# Patient Record
Sex: FEMALE | Race: WHITE
[De-identification: names, ages, dates, MRNs, and addresses within clinical notes are randomized per-mention and may not be internally consistent; named-entity substitution may affect disease eponyms.]

---

## 2018-06-14 ENCOUNTER — HOSPITAL ENCOUNTER (EMERGENCY)
Age: 78
Discharge: HOME | End: 2018-06-14
Payer: MEDICARE

## 2018-06-14 VITALS
OXYGEN SATURATION: 95 % | SYSTOLIC BLOOD PRESSURE: 130 MMHG | RESPIRATION RATE: 18 BRPM | TEMPERATURE: 97.7 F | HEART RATE: 83 BPM | DIASTOLIC BLOOD PRESSURE: 71 MMHG

## 2018-06-14 VITALS
SYSTOLIC BLOOD PRESSURE: 159 MMHG | OXYGEN SATURATION: 95 % | DIASTOLIC BLOOD PRESSURE: 79 MMHG | RESPIRATION RATE: 16 BRPM | HEART RATE: 91 BPM

## 2018-06-14 VITALS
HEART RATE: 83 BPM | RESPIRATION RATE: 14 BRPM | DIASTOLIC BLOOD PRESSURE: 87 MMHG | TEMPERATURE: 98.8 F | SYSTOLIC BLOOD PRESSURE: 152 MMHG | OXYGEN SATURATION: 96 %

## 2018-06-14 VITALS
RESPIRATION RATE: 15 BRPM | SYSTOLIC BLOOD PRESSURE: 145 MMHG | HEART RATE: 84 BPM | OXYGEN SATURATION: 94 % | DIASTOLIC BLOOD PRESSURE: 69 MMHG | TEMPERATURE: 97.88 F

## 2018-06-14 VITALS
HEART RATE: 78 BPM | DIASTOLIC BLOOD PRESSURE: 90 MMHG | OXYGEN SATURATION: 95 % | SYSTOLIC BLOOD PRESSURE: 106 MMHG | RESPIRATION RATE: 15 BRPM

## 2018-06-14 VITALS
OXYGEN SATURATION: 97 % | TEMPERATURE: 97.6 F | RESPIRATION RATE: 18 BRPM | SYSTOLIC BLOOD PRESSURE: 139 MMHG | DIASTOLIC BLOOD PRESSURE: 69 MMHG | HEART RATE: 87 BPM

## 2018-06-14 VITALS
HEART RATE: 87 BPM | DIASTOLIC BLOOD PRESSURE: 65 MMHG | OXYGEN SATURATION: 97 % | RESPIRATION RATE: 15 BRPM | SYSTOLIC BLOOD PRESSURE: 136 MMHG

## 2018-06-14 VITALS
OXYGEN SATURATION: 96 % | HEART RATE: 89 BPM | SYSTOLIC BLOOD PRESSURE: 152 MMHG | DIASTOLIC BLOOD PRESSURE: 88 MMHG | RESPIRATION RATE: 17 BRPM

## 2018-06-14 VITALS
TEMPERATURE: 97.5 F | DIASTOLIC BLOOD PRESSURE: 67 MMHG | HEART RATE: 82 BPM | RESPIRATION RATE: 18 BRPM | SYSTOLIC BLOOD PRESSURE: 145 MMHG | OXYGEN SATURATION: 97 %

## 2018-06-14 VITALS
OXYGEN SATURATION: 95 % | RESPIRATION RATE: 16 BRPM | DIASTOLIC BLOOD PRESSURE: 89 MMHG | SYSTOLIC BLOOD PRESSURE: 152 MMHG | HEART RATE: 81 BPM

## 2018-06-14 VITALS
RESPIRATION RATE: 12 BRPM | TEMPERATURE: 98.06 F | HEART RATE: 81 BPM | SYSTOLIC BLOOD PRESSURE: 153 MMHG | DIASTOLIC BLOOD PRESSURE: 80 MMHG | OXYGEN SATURATION: 98 %

## 2018-06-14 VITALS
OXYGEN SATURATION: 99 % | SYSTOLIC BLOOD PRESSURE: 140 MMHG | HEART RATE: 86 BPM | DIASTOLIC BLOOD PRESSURE: 74 MMHG | RESPIRATION RATE: 20 BRPM | TEMPERATURE: 98.06 F

## 2018-06-14 VITALS — SYSTOLIC BLOOD PRESSURE: 141 MMHG | DIASTOLIC BLOOD PRESSURE: 69 MMHG | TEMPERATURE: 99.14 F | OXYGEN SATURATION: 95 %

## 2018-06-14 VITALS — BODY MASS INDEX: 32.8 KG/M2

## 2018-06-14 VITALS — OXYGEN SATURATION: 95 %

## 2018-06-14 VITALS
SYSTOLIC BLOOD PRESSURE: 172 MMHG | TEMPERATURE: 97.34 F | HEART RATE: 84 BPM | OXYGEN SATURATION: 97 % | RESPIRATION RATE: 18 BRPM | DIASTOLIC BLOOD PRESSURE: 87 MMHG

## 2018-06-14 VITALS
TEMPERATURE: 99.3 F | SYSTOLIC BLOOD PRESSURE: 123 MMHG | DIASTOLIC BLOOD PRESSURE: 90 MMHG | RESPIRATION RATE: 17 BRPM | OXYGEN SATURATION: 94 % | HEART RATE: 91 BPM

## 2018-06-14 DIAGNOSIS — R53.1: Primary | ICD-10-CM

## 2018-06-14 LAB
ADD MANUAL DIFF / SLIDE REVIEW: NO
ALBUMIN SERPL-MCNC: 4.4 G/DL (ref 3.5–5)
ALBUMIN/GLOB SERPL: 1.3 {RATIO} (ref 1–2.8)
ALP SERPL-CCNC: 64 U/L (ref 38–126)
ALT SERPL-CCNC: 52 IU/L (ref 9–52)
BUN SERPL-MCNC: 24 MG/DL (ref 7–17)
CALCIUM SERPL-MCNC: 9.8 MG/DL (ref 8.4–10.2)
CHLORIDE SERPL-SCNC: 94 MMOL/L (ref 98–107)
CO2 SERPL-SCNC: 29 MMOL/L (ref 22–32)
ESTIMATED GLOMERULAR FILT RATE: > 60 ML/MIN (ref 60–?)
GLOBULIN SER CALC-MCNC: 3.3 G/DL (ref 1.7–4.1)
GLUCOSE SERPL-MCNC: 101 MG/DL (ref 80–110)
HEMATOCRIT: 40.3 % (ref 36–46)
HEMOGLOBIN: 13.8 G/DL (ref 12–16)
HEMOLYSIS: < 15 (ref 0–50)
MCV RBC: 94.1 FL (ref 80–100)
MEAN CORPUSCULAR HEMOGLOBIN: 32.2 PG (ref 26–34)
MEAN CORPUSCULAR HGB CONC: 34.2 % (ref 30–36)
PLATELET COUNT: 290 X10^3/UL (ref 150–400)
POTASSIUM SERPL-SCNC: 3.3 MMOL/L (ref 3.4–5.1)
PROT SERPL-MCNC: 7.7 G/DL (ref 6.3–8.2)
SODIUM SERPL-SCNC: 136 MMOL/L (ref 137–145)

## 2018-06-14 PROCEDURE — 99282 EMERGENCY DEPT VISIT SF MDM: CPT

## 2018-06-14 PROCEDURE — 85025 COMPLETE CBC W/AUTO DIFF WBC: CPT

## 2018-06-14 PROCEDURE — 36591 DRAW BLOOD OFF VENOUS DEVICE: CPT

## 2018-06-14 PROCEDURE — 0HBU0ZZ EXCISION OF LEFT BREAST, OPEN APPROACH: ICD-10-PCS | Performed by: SPECIALIST

## 2018-06-14 PROCEDURE — 73502 X-RAY EXAM HIP UNI 2-3 VIEWS: CPT

## 2018-06-14 PROCEDURE — 80053 COMPREHEN METABOLIC PANEL: CPT

## 2018-06-14 PROCEDURE — 93005 ELECTROCARDIOGRAM TRACING: CPT

## 2018-06-14 RX ADMIN — SODIUM CHLORIDE, SODIUM LACTATE, POTASSIUM CHLORIDE, AND CALCIUM CHLORIDE 200 ML: .6; .31; .03; .02 INJECTION, SOLUTION INTRAVENOUS at 16:36

## 2018-06-14 RX ADMIN — LATANOPROST 1 DROPS: 50 SOLUTION OPHTHALMIC at 21:40

## 2018-06-14 RX ADMIN — CEFAZOLIN SODIUM 200 GM: 2 INJECTION, SOLUTION INTRAVENOUS at 18:40

## 2018-06-14 RX ADMIN — MIDAZOLAM HYDROCHLORIDE 2 MG: 1 INJECTION, SOLUTION INTRAMUSCULAR; INTRAVENOUS at 19:48

## 2018-06-14 RX ADMIN — DEXTROSE AND SODIUM CHLORIDE 125 ML: 5; 450 INJECTION, SOLUTION INTRAVENOUS at 21:38

## 2018-06-14 RX ADMIN — GABAPENTIN 300 MG: 300 CAPSULE ORAL at 21:39

## 2018-06-14 RX ADMIN — DOXEPIN HYDROCHLORIDE 25 MG: 25 CAPSULE ORAL at 21:40

## 2018-06-14 RX ADMIN — MIRTAZAPINE 30 MG: 15 TABLET, FILM COATED ORAL at 21:41

## 2018-06-14 RX ADMIN — MIDAZOLAM HYDROCHLORIDE 2 MG: 1 INJECTION, SOLUTION INTRAMUSCULAR; INTRAVENOUS at 19:43

## 2018-06-14 NOTE — P.OP.PRE_ITS
Pre-operative Note    
Interval Note    
Pre-op Check: History & Physical Reviewed by Physician and Exam Performed    
H&P completed within 30 days and has changed as indicated here:: Fell yesterday   
and has back pain across her lower back.  She is known to have chronic back   
pain. She also has developed redness and drainage around the sebaceous cyst in   
the left breast.  She is desperate to have it removed. I told her that I   
probably could not close it. Because she 8 lunch we have had to wait 6 hr   
before proceeding.  No other changes.

## 2018-06-14 NOTE — PATH_ITS
[facility] Accession Number: [id number]  
.                                                                01  
Material submitted:                                        .  
LEFT BREAST CYST  
.                                                                01  
Clinical history:                                          .  
HX OF LEFT BREAST CANCER AND RADIATION  
.                                                                02  
**********************************************************************  
Diagnosis:  
Left Breast Cyst, Excision:  
     Skin with no significant diagnostic abnormality.  
     Submucosal fibrous tissue with patchy ossification and associated  
      abscess.  
     Changes consistent with previous instrumentation are present.  
     Foreign body material present.  
     Negative for atypia or malignancy; negative for atypical epithelial  
      cells by immunohistochemistry studies.  
.  
.  
[facility]/06/21/2018  
**********************************************************************  
.                                                                02  
Electronically signed:                                     .  
Tabby Ramirez MD, Pathologist  
NPI- [id number]  
.                                                                01  
Gross description:                                         .  
Received in formalin, labeled left breast cyst, is an unoriented piece of  
tan-yellow firm adipose tissue (10.5 x 5.5 x 3.2 cm) with overlying skin  
(6.5 x 3.9 cm).  The skin is irregularly firm and ulcerated (1.2 x 1.1 cm)  
located 1.2 cm from the closest peripheral resection margin.  The  
underlying tissue is fatty and focally hard and mineralized.  The  
resection margin is inked black. Four bisected full thickness sections  
submitted.  ([id number])  
/[facility]  
.                                                                02  
Microscopic:                                                    .  
A.  An immunohistochemical stain was performed on block A5 to evaluate for  
ABHIJIT, and is negative.  
.  
The rare atypical stromal cells are immunonegative for ABHIJIT,  
consistent with a reactive, non-epithelial origin.  
.  
* This test was developed and its performance characteristics determined  
by [employer]. It has not been cleared or approved by the U.S. Food and Drug  
Administration. The FDA has determined that such clearance or approval is  
not necessary. This test is used for clinical purposes. It should not be  
regarded as investigational or for research.  
.                                                                02  
Pathologist provided ICD-10:  
Z85.3  
.                                                                02  
CPT                                                        .  
393373, K11048  
***Performed at:  01  
   [facility]  
   [address] [place] [address], [place], [place]  [id number]  
   MD Daniel Toweill MD Phone:  [phone]  
***Performed at:  02  
   [facility]  
   [address] [place], [place]  [id number]  
   MD Hero Wilson MD Phone:  [phone]

## 2018-06-14 NOTE — PM.OP.1
Operative Date/Time/Diagnoses
-
Date of procedure: 06/14/18
Time of procedure: 19:54
Pre-op diagnosis: Large inflamed sebaceous cyst chronic left breast.  History of breast cancer 

Post-op diagnosis: same

Procedure & Clinicians
Procedure: Removal of this very large inflamed cyst with closure over drain.(lumpectomy)
Same procedure as scheduled: Yes
Indications: Large neglected cyst  on the breast.  Skin has become inflamed at the edge of the opening.
Surgeon: Preston Shi
Click Yes if Unassisted: Yes
Anesthesia Type: General
Operative Notes
Findings: Very large cyst with an extension inferiorly that led deep to the breast almost to the muscle chest wall
Closure Type: primary (Over a drain)
Specimen(s): other (Large cyst)
Implants & Drains: 19 mm Roque
Estimated Blood Loss (mL): 15
Blood products transfused: none
Procedure in detail: The patient was placed supine on the operating table underwent general LMA anesthesia.  She was prepped and draped in the usual fashion using Betadine.  An incision was made around this very large inflamed cyst in normal 
appearing tissue.  Was carried deep to the cyst which was quite significant.  In the inferior aspect of this wound the cyst cavity actually dive very deep almost to the chest wall. All of this was excised and submitted.  Hemostasis was achieved with 
cautery and 3 0 Polysorb suture ligatures.  The wound was copiously irrigated.  I loosely closed the skin using large vertical mattress nylon sutures over a Roque drain which was left in the deep section of this biopsy cavity. It was brought out 
through the wound. This was done because the patient is incapable managing the wound and lives alone.  While we hope to find a way to place her or get assistance, there is no acute that that would happen this is a risky procedure because of the 
inflammation but I think stands probably a 3/4 chance of succeeding.  We will continue antibiotics postop.  A dressing was applied the patient tolerated the procedure well.  She is awakened and taken to the recovery area in good condition.
Complications: none
Condition: stable
Disposition: PACU

## 2018-06-15 ENCOUNTER — HOSPITAL ENCOUNTER (INPATIENT)
Dept: HOSPITAL 73 - OR | Age: 78
LOS: 4 days | Discharge: NURSING, REHAB OR LONG-TERM CARE | DRG: 584 | End: 2018-06-19
Payer: MEDICARE

## 2018-06-15 VITALS
HEART RATE: 82 BPM | RESPIRATION RATE: 18 BRPM | TEMPERATURE: 97.7 F | OXYGEN SATURATION: 95 % | SYSTOLIC BLOOD PRESSURE: 147 MMHG | DIASTOLIC BLOOD PRESSURE: 64 MMHG

## 2018-06-15 VITALS
SYSTOLIC BLOOD PRESSURE: 108 MMHG | DIASTOLIC BLOOD PRESSURE: 59 MMHG | HEART RATE: 89 BPM | TEMPERATURE: 98 F | RESPIRATION RATE: 18 BRPM | OXYGEN SATURATION: 98 %

## 2018-06-15 VITALS
RESPIRATION RATE: 16 BRPM | SYSTOLIC BLOOD PRESSURE: 142 MMHG | HEART RATE: 76 BPM | TEMPERATURE: 98.06 F | OXYGEN SATURATION: 93 % | DIASTOLIC BLOOD PRESSURE: 82 MMHG

## 2018-06-15 VITALS
RESPIRATION RATE: 16 BRPM | DIASTOLIC BLOOD PRESSURE: 70 MMHG | HEART RATE: 75 BPM | OXYGEN SATURATION: 93 % | TEMPERATURE: 98.06 F | SYSTOLIC BLOOD PRESSURE: 136 MMHG

## 2018-06-15 VITALS
SYSTOLIC BLOOD PRESSURE: 120 MMHG | OXYGEN SATURATION: 93 % | HEART RATE: 78 BPM | TEMPERATURE: 96.44 F | RESPIRATION RATE: 18 BRPM | DIASTOLIC BLOOD PRESSURE: 62 MMHG

## 2018-06-15 VITALS — OXYGEN SATURATION: 94 %

## 2018-06-15 VITALS — BODY MASS INDEX: 32.1 KG/M2

## 2018-06-15 VITALS — OXYGEN SATURATION: 95 %

## 2018-06-15 VITALS — OXYGEN SATURATION: 96 %

## 2018-06-15 VITALS
RESPIRATION RATE: 16 BRPM | TEMPERATURE: 97.16 F | DIASTOLIC BLOOD PRESSURE: 71 MMHG | OXYGEN SATURATION: 94 % | HEART RATE: 77 BPM | SYSTOLIC BLOOD PRESSURE: 106 MMHG

## 2018-06-15 VITALS — OXYGEN SATURATION: 93 %

## 2018-06-15 DIAGNOSIS — N60.82: Primary | ICD-10-CM

## 2018-06-15 DIAGNOSIS — S32.511A: ICD-10-CM

## 2018-06-15 DIAGNOSIS — S73.191A: ICD-10-CM
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
## 2018-06-15 DIAGNOSIS — R09.02: ICD-10-CM

## 2018-06-15 DIAGNOSIS — F41.9: ICD-10-CM

## 2018-06-15 DIAGNOSIS — E03.9: ICD-10-CM

## 2018-06-15 DIAGNOSIS — Z91.81: ICD-10-CM

## 2018-06-15 DIAGNOSIS — J98.11: ICD-10-CM

## 2018-06-15 DIAGNOSIS — I10: ICD-10-CM
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
## 2018-06-15 DIAGNOSIS — R33.9: ICD-10-CM

## 2018-06-15 DIAGNOSIS — S32.10XA: ICD-10-CM
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
## 2018-06-15 DIAGNOSIS — F03.90: ICD-10-CM

## 2018-06-15 DIAGNOSIS — E87.6: ICD-10-CM

## 2018-06-15 DIAGNOSIS — K21.9: ICD-10-CM

## 2018-06-15 LAB
ADD MANUAL DIFF / SLIDE REVIEW: NO
BUN SERPL-MCNC: 13 MG/DL (ref 7–17)
CALCIUM SERPL-MCNC: 8.6 MG/DL (ref 8.4–10.2)
CHLORIDE SERPL-SCNC: 94 MMOL/L (ref 98–107)
CO2 SERPL-SCNC: 30 MMOL/L (ref 22–32)
ESTIMATED GLOMERULAR FILT RATE: > 60 ML/MIN (ref 60–?)
GLUCOSE SERPL-MCNC: 135 MG/DL (ref 80–110)
HEMATOCRIT: 36.7 % (ref 36–46)
HEMOGLOBIN: 12.6 G/DL (ref 12–16)
HEMOLYSIS: < 15 (ref 0–50)
MCV RBC: 94.7 FL (ref 80–100)
MEAN CORPUSCULAR HEMOGLOBIN: 32.4 PG (ref 26–34)
MEAN CORPUSCULAR HGB CONC: 34.2 % (ref 30–36)
PLATELET COUNT: 231 X10^3/UL (ref 150–400)
POTASSIUM SERPL-SCNC: 3.1 MMOL/L (ref 3.4–5.1)
SODIUM SERPL-SCNC: 131 MMOL/L (ref 137–145)

## 2018-06-15 PROCEDURE — 97530 THERAPEUTIC ACTIVITIES: CPT

## 2018-06-15 PROCEDURE — 97116 GAIT TRAINING THERAPY: CPT

## 2018-06-15 PROCEDURE — 73502 X-RAY EXAM HIP UNI 2-3 VIEWS: CPT

## 2018-06-15 PROCEDURE — 36591 DRAW BLOOD OFF VENOUS DEVICE: CPT

## 2018-06-15 PROCEDURE — 80053 COMPREHEN METABOLIC PANEL: CPT

## 2018-06-15 PROCEDURE — 85025 COMPLETE CBC W/AUTO DIFF WBC: CPT

## 2018-06-15 PROCEDURE — 94760 N-INVAS EAR/PLS OXIMETRY 1: CPT

## 2018-06-15 PROCEDURE — 80048 BASIC METABOLIC PNL TOTAL CA: CPT

## 2018-06-15 PROCEDURE — 36415 COLL VENOUS BLD VENIPUNCTURE: CPT

## 2018-06-15 PROCEDURE — 97110 THERAPEUTIC EXERCISES: CPT

## 2018-06-15 PROCEDURE — 97162 PT EVAL MOD COMPLEX 30 MIN: CPT

## 2018-06-15 PROCEDURE — 97535 SELF CARE MNGMENT TRAINING: CPT

## 2018-06-15 PROCEDURE — 19120 REMOVAL OF BREAST LESION: CPT

## 2018-06-15 PROCEDURE — 71045 X-RAY EXAM CHEST 1 VIEW: CPT

## 2018-06-15 PROCEDURE — 72100 X-RAY EXAM L-S SPINE 2/3 VWS: CPT

## 2018-06-15 PROCEDURE — 93005 ELECTROCARDIOGRAM TRACING: CPT

## 2018-06-15 PROCEDURE — 36592 COLLECT BLOOD FROM PICC: CPT

## 2018-06-15 PROCEDURE — 99282 EMERGENCY DEPT VISIT SF MDM: CPT

## 2018-06-15 PROCEDURE — 73721 MRI JNT OF LWR EXTRE W/O DYE: CPT

## 2018-06-15 RX ADMIN — GABAPENTIN 300 MG: 300 CAPSULE ORAL at 20:50

## 2018-06-15 RX ADMIN — GABAPENTIN 300 MG: 300 CAPSULE ORAL at 10:05

## 2018-06-15 RX ADMIN — DOXEPIN HYDROCHLORIDE 25 MG: 25 CAPSULE ORAL at 20:50

## 2018-06-15 RX ADMIN — TRAZODONE HYDROCHLORIDE 100 MG: 100 TABLET ORAL at 22:50

## 2018-06-15 RX ADMIN — ENOXAPARIN SODIUM 40 MG: 40 INJECTION SUBCUTANEOUS at 10:05

## 2018-06-15 RX ADMIN — OXYCODONE AND ACETAMINOPHEN 2 TAB: 5; 325 TABLET ORAL at 06:42

## 2018-06-15 RX ADMIN — CEFAZOLIN SODIUM 200 GM: 2 INJECTION, SOLUTION INTRAVENOUS at 11:25

## 2018-06-15 RX ADMIN — MELOXICAM 7.5 MG: 7.5 TABLET ORAL at 10:05

## 2018-06-15 RX ADMIN — TRAZODONE HYDROCHLORIDE 100 MG: 100 TABLET ORAL at 00:06

## 2018-06-15 RX ADMIN — LATANOPROST 1 DROPS: 50 SOLUTION OPHTHALMIC at 20:50

## 2018-06-15 RX ADMIN — CEFAZOLIN SODIUM 200 GM: 2 INJECTION, SOLUTION INTRAVENOUS at 20:49

## 2018-06-15 RX ADMIN — OXYCODONE AND ACETAMINOPHEN 2 TAB: 5; 325 TABLET ORAL at 15:56

## 2018-06-15 RX ADMIN — DEXTROSE AND SODIUM CHLORIDE 125 ML: 5; 450 INJECTION, SOLUTION INTRAVENOUS at 16:00

## 2018-06-15 RX ADMIN — CEFAZOLIN SODIUM 200 GM: 2 INJECTION, SOLUTION INTRAVENOUS at 03:23

## 2018-06-15 RX ADMIN — ONDANSETRON 8 MG: 4 TABLET, ORALLY DISINTEGRATING ORAL at 09:11

## 2018-06-15 RX ADMIN — MIRTAZAPINE 30 MG: 15 TABLET, FILM COATED ORAL at 20:50

## 2018-06-15 RX ADMIN — DEXTROSE AND SODIUM CHLORIDE 125 ML: 5; 450 INJECTION, SOLUTION INTRAVENOUS at 05:52

## 2018-06-15 RX ADMIN — LEVOTHYROXINE SODIUM 100 MCG: 75 TABLET ORAL at 06:42

## 2018-06-15 RX ADMIN — PANTOPRAZOLE SODIUM 40 MG: 40 TABLET, DELAYED RELEASE ORAL at 10:05

## 2018-06-15 NOTE — ED_ITS
"HPI - Weakness    
General    
Chief complaint: Weakness    
Stated complaint: Weakness    
Time Seen by Provider: 06/14/18 13:38    
Source: patient, family, EMS and RN notes reviewed    
Mode of arrival: EMS    
Limitations: no limitations    
History of Present Illness    
HPI Narrative: Patient is a 70-year-old female presenting with increased   
increase in falls.  She apparently fell twice yesterday and has increased   
weakness today.  Today she said she lowered herself to the ground yesterday she   
did fall and hurt her right hip but she has been ambulatory.  She has is a very   
nice cane which she refuses to use.  She also has a Life Alert button which she   
also refuses to use.  She is actually quite good spirits.  She is supposed to   
have a sebaceous cyst removed from her left breast today infecting couple of   
hours.  She denies any fever or chills.      
MD Complaint: generalized weakness    
Related Data    
 Home Medications    
    
    
    
 Medication  Instructions  Recorded  Confirmed    
     
hydrochlorothiazide 50 mg PO QDAY #0 12/06/16 06/14/18    
     
cholecalciferol (vitamin D3) 5,000 unit PO DAILY 06/06/18 06/14/18    
    
[Vitamin D3]       
     
doxepin 25 mg PO BEDTIME 06/06/18 06/14/18    
     
hydrocodone-acetaminophen 1 - 2 tab PO DAILY PRN 06/06/18 06/14/18    
     
omeprazole 20 mg PO DAILY 06/06/18 06/14/18    
     
sucralfate 1 g PO QID 06/06/18 06/14/18    
     
fluticasone 1 spray INTRANASAL DAILY 06/14/18 06/14/18    
     
latanoprost 1 drp OPHTHALMIC (EYE) BEDTIME 06/14/18 06/14/18    
     
levothyroxine 100 mcg PO DAILY 06/14/18 06/14/18    
     
meclizine 25 mg PO Q8H PRN 06/14/18 06/14/18    
     
meloxicam 1 tab PO DAILY 06/14/18 06/14/18    
     
mirtazapine 1 tab PO BEDTIME 06/14/18 06/14/18    
     
ondansetron HCl 8 mg PO Q8H PRN 06/14/18 06/14/18    
     
trazodone 100 mg PO BEDTIME PRN 06/14/18 06/14/18    
    
    
    
 Allergies    
    
    
    
Allergy/AdvReac Type Severity Reaction Status Date / Time    
     
No Known Drug Allergies Allergy   Verified 06/14/18 16:08    
    
    
    
    
Review of Systems    
Review of Systems    
All systems reviewed & are unremarkable except as noted in HPI and below     
Constitutional    
Denies chills, Reports fatigue, Denies fever(s), Reports frequent falls, Denies   
lethargy and Denies weakness    
Cardiovascular    
Denies chest pain, Denies irregular heart rhythm, Denies lightheadedness,   
Denies palpitations, Denies dyspnea, Denies dyspnea on exertion and Denies   
orthopnea    
Respiratory    
Denies cough, Denies dyspnea, Denies dyspnea on exertion and Denies wheezing    
Gastrointestinal    
Gastrointestinal: Denies abdominal pain, Denies change in bowel habits, Denies   
diarrhea, Denies nausea and Denies vomiting    
Genitourinary    
Denies hematuria, Denies flank pain, Denies urinary incontinence and Denies   
urinary urgency    
Musculoskeletal    
Reports system reviewed and no additional complaints, except as docu, Reports   
as per HPI, Denies back pain, Denies muscle weakness, Denies numbness and   
Denies tingling    
Neurologic    
Reports frequent falls, Denies numbness, Denies tingling and Denies weakness    
Endocrine    
Reports fatigue and Denies palpitations    
Allergic/Immunologic    
Denies wheezing    
    
PFSH    
Medical History    
    
Anxiety (Acute)    
Bakers cyst (Acute)    
Bruises easily (Acute)    
History of UTI (Acute)    
Hypothyroid (Acute)    
Impaired vision (Acute)    
Spinal stenosis (Acute)    
    
    
Surgical History    
    
History of colonoscopy (Acute)    
History of esophagogastroduodenoscopy (EGD) (Acute)    
Status post breast lumpectomy    
Status post hysterectomy with oophorectomy    
    
    
Family History    
Sister  Age: 75 Osteoporosis    
    
    
Social History    
household members:  none    
    
Smoking Stat
[id number]|2018-06-14 13:40:00|2018-06-14 14:16:00|DI.RAD.S_ITS|CLIK|Imaging|[id number]|"PROCEDURE:  XR HIP W PEL IF DONE RT 2V

## 2018-06-15 NOTE — CM.DANOTE
Addendum entered by ALBARO Duran  06/15/18 15:46: 
In addition to needing skilled PT/OT, pt will require wound care per notes. 


Original Note:

DCP Assessment: Pt is a 77 yo female, resident of [place], [place]. Pt admitted for an excision of her left breast by Dr Shi, and in addition, pt has had multiple recent falls, Ortho consult pending. Pt's PCP is Dr Scott; Insurance is Medicare. 

Spoke w/ Vanessa, PT. She explained that pt is requiring max assist of 2 people for sit to stand. Pt is mostly indp at her baseline. Dtr Lupis told Vanessa away from bedside that she could not have pt staying w/her more than a few days because they do not 
get along well. 

Met w/pt and her dtr at bedside, explained role. Both reviewed the falls that pt has had at home and both this MSW and dtr question why pt will not use her lifeline button (?) Pt admits its pride that stops her. Discussed PT's recommendation for SNF 
and pt not agreeable at this time, says she wants to go home. 

This MSW was called out of the  for an emergent DC and when I returned, pt was alone and reiterated  I'm going home . 

Per review of Dr Constantino's consult note; MRI ordered in hopes an etiology can be found for pt's pain and recent falls. 

This MSW following closely for coordination of the safest plan that pt will be agreeable to. 

ALBARO Duran

## 2018-06-15 NOTE — PT.IPTN
Current Diagnoses

Sebaceous cyst (06/15/18)
Other benign mammary dysplasias of left breast (06/15/18)

Surgery Performed

Operation Date: 06/14/18 15:15
Actual Procedures
p Excision Breast Cyst(Left) - Preston Shi MD

Physical Therapy Treatment Note

M2 PT-IP Current Condition                                 Start:  06/15/18 12:22
Freq:   AS NEEDED                                          Status: Active        
Protocol:                                                                        
 Document     06/15/18 15:14  AB  (Rec: 06/15/18 15:23  AB  [email or web address])
 Physical Therapy Current Condition
     Current Condition
      Evaluation Date                            06/15/18
      Treatment Diagnosis                        s/p L lumpectomy
      Onset Date                                 06/14/18
     Weight Bearing Status
      Weight Bearing Status                      Non-Weight Bearing
      Allowed Weight Bearing Amount (enter %     pt seen for PT eval this
       or #) (%)                                 morning. spoke with case
                                                 manager regarding SNF
                                                 recommendation this morning.
                                                  informed PT ~2pm
                                                 that pt was seen by Dr. Constantino for R hip pain and
                                                 MRI was ordered.  Checked for
                                                 MRI result and no result as of
                                                 this time.  Read Dr. Constantino
                                                 's consultation note ~310 pm
                                                 and per documentation:   At
                                                 this time she should be
                                                 nonweightbearing on the right
                                                 lower extremity until advanced
                                                 imaging is reviewed.

## 2018-06-15 NOTE — PT.IPTN
"
Current Diagnoses

Sebaceous cyst (06/15/18)
Other benign mammary dysplasias of left breast (06/15/18)

Surgery Performed

Operation Date: 06/14/18 15:15
Actual Procedures
p Excision Breast Cyst(Left) - Preston Shi MD

Physical Therapy Treatment Note

M2 PT-IP Current Condition                                 Start:  06/15/18 12:22
Freq:   AS NEEDED                                          Status: Active        
Protocol:                                                                        
 Document     06/15/18 15:14  AB  (Rec: 06/15/18 15:23  AB  [email or web address])
 Physical Therapy Current Condition
     Current Condition
      Evaluation Date                            06/15/18
      Treatment Diagnosis                        s/p L lumpectomy
      Onset Date                                 06/14/18
     Weight Bearing Status
      Weight Bearing Status                      Non-Weight Bearing
      Allowed Weight Bearing Amount (enter %     pt seen for PT eval this
       or #) (%)                                 morning. spoke with case
                                                 manager regarding SNF
                                                 recommendation this morning.
                                                  informed PT ~2pm
                                                 that pt was seen by Dr. Constantino for R hip pain and
                                                 MRI was ordered.  Checked for
                                                 MRI result and no result as of
                                                 this time.  Read Dr. Constantino
                                                 's consultation note ~310 pm
                                                 and per documentation:   At
                                                 this time she should be
                                                 nonweightbearing on the right
                                                 lower extremity until advanced
                                                 imaging is reviewed. 
M3 PT-IP Subjective                                        Start:  06/15/18 12:22
Freq:   AS NEEDED                                          Status: Active        
Protocol:                                                                        
 Document     06/15/18 12:23  AB  (Rec: 06/15/18 12:35  AB  [email or web address])
 Subjective
     Physical Therapy Visit Type
      Type                                       Initial Evaluation
      Visit Start Time                           11:45
      Visit Stop Time                            12:26
      Total Visit Minutes                        41
      Number of PTA Visits                       0
     Physical Therapy Visit Comments
      Patient Comments                           pt hesistant to get out of bed
                                                 but agreed to try
 Therapy Pain Assessment
     Pain
      When Pain Assessed                         During Mobility
     Pain Present
      Pain Present                               Pain Reported
     Location
      Right Groin
       Intensity                                 8
       Scale Used                                Numeric (1 - 10)
M4 PT-IP Mobility and Gait                                 Start:  06/15/18 12:22
Freq:   AS NEEDED                                          Status: Active        
Protocol:                                                                        
 Document     06/15/18 12:23  AB  (Rec: 06/15/18 12:35  AB  [email or web address])
 PT-Bed Mobility Assessment
     Rolling
       Level of Assist                           Maximal Assistance
                                                 1 Person Assistance
                                                 2 Person Assistance
 PT-Transfer Assessment
     Sit to and From Stand
       Sit to and from Stand 
[id number]|2018-06-17 09:57:38|2018-06-17 09:57:38|PM.PN.1||||"Subjective

## 2018-06-15 NOTE — DI.MRI.S_ITS
PROCEDURE:  MR HIP RT WO CON  
   
INDICATIONS:  Right hip/groin pain post fall  
   
TECHNIQUE:    
Noncontrast coronal T1 spin echo and STIR through the bony pelvis.  Coronal and axial T2   
fast spin echo with fat saturation, sagittal T1 spin echo, and oblique axial T2 fast spin   
echo with fat saturation through the hip.    
   
COMPARISON:  [facility], CR, XR HIP W PEL IF DONE RT 2V, 6/14/2018, 13:23.  [facility], CT, ABDOMEN/PELVIS WITH CONTRAST, 2/22/2017, 11:16.  
   
FINDINGS:    
Image quality: Diagnostic.  
   
Bones and joints: There is no acute fracture, dislocation, suspicious osseous lesion, or   
evidence of avascular necrosis involving the right hip.  Marrow edema is evident,   
however, involving the superior pubic ramus with an associated nondisplaced fracture   
evident.  A similar appearance is noted involving the inferior pubic ramus, as well.    
There is no widening of the pubis symphysis.  Edema is evident within the adjacent soft   
tissues.  There also is edema evident involving the right aspect of the sacral ala with   
probable microtrabecular fracture lines present.  There mild degenerative changes of the   
right hip.  Degenerative changes of the included lower lumbar spine, sacroiliac joints,   
and left hip are noted.  There is a small right hip joint effusion.  
   
Tendons and ligaments:  There is slight increased signal identified involving the distal   
gluteus medius and gluteus minimus tendons without significant tearing evident.  Fluid is   
contained within the greater trochanteric bursa.  The distal iliopsoas tendons are   
intact.  The proximal hamstrings tendons demonstrate slight increased signal.  There is   
diffuse muscle edema identified involving all of the adductor muscles.  There also   
appears to be edema involving the right piriformis muscles and the distal margins of the   
gluteus medius and gluteus minimus muscles.  The ligamentum teres appears intact where   
visualized.    
   
Labrum: Evaluation of the acetabular labrum is difficult without intra-articular   
contrast.  However, there is increased signal identified at the labral cartilaginous   
junction of the superior labrum, suggesting a small anterosuperior labral tear.  
   
Soft tissues:  Visualized muscles demonstrate normal bulk and internal signal.  Quadratus   
femoris muscle demonstrates no internal edema to suggest ischiofemoral impingement.  The   
proximal sciatic neurovascular bundle appears normal adjacent to the hamstring tendons.    
No free pelvic fluid.  Bladder wall thickness is normal.  Genitourinary structures and   
bowel loops appear normal where visualized.    
   
IMPRESSION:  
1.  No acute right hip fracture.  
2.  Nondisplaced superior and inferior right pubic rami fractures.  
3.  Right sacral insufficiency fracture.  
4.  Adductor muscle edema is felt to be reactive.  
5.  Small anterosuperior right acetabular labral tear.  
6.  Small right hip joint effusion.  
7.  Mild distal gluteus medius and gluteus minimus tendinopathy.  Moderate amount of   
fluid is contained within the greater trochanteric bursa.  Please correlate clinically   
for possible bursitis.  
8.  Mild proximal right hamstrings tendinopathy.    
   
Dictated by: Julito Butts M.D. on 6/15/2018 at 14:12       
Approved by: Julito Butts M.D. on 6/15/2018 at 14:24

## 2018-06-15 NOTE — PM.PNPO.1
Subjective
Date Patient Seen: 06/15/18
Time Patient Seen: 11:51
Interval history: Patient required 2 people to assist her to stand and sit in a chair.  She was unable to really walk and could not weight bear use the right leg well. One daughter asked me if she thought she had a stroke.

Exam
Vital Signs
(past 8 hours):  Vital Signs - 8 hr

 06/15/18
09:34 06/15/18
11:55 06/15/18
13:10
Temperature  97.2 F L 
Pulse Rate  77 
Respiratory Rate  16 
Blood Pressure  106/71 
Pulse Oximetry 93 94 94



Pulse Oximetry                 94
Oxygen Delivery Method         Room Air
Oxygen Flow Rate               0


Narrative
Exam Narrative: Her breast looks fine.  Drainage is bloody as would be expected.  It is not purulent.  There is no cellulitis.  There was drainage on the dressing as expected.  The patient complains of pain in the right hip and medially but she is 
alert as ever.  She is oriented. She moves her other extremities without difficulty.

Objective
Labs

Result Diagrams: 
06/15/18 05:58          

Labs:  Laboratory Results - last 24 hr

  06/15/18
  05:58
WBC  9.1
RBC  3.88 L
Hgb  12.6
Hct  36.7
MCV  94.7
MCH  32.4
MCHC  34.2
RDW  13.6
Plt Count  231
Neut % (Auto)  78.2 H
Lymph % (Auto)  10.0 L
Mono % (Auto)  8.1
Eos % (Auto)  2.9
Baso % (Auto)  0.8
Neut # (Auto)  7100 H



Assessment & Plan Post-op
(1) Sebaceous cyst of skin of left breast: 
      Current Visit: Yes
      Status: Acute
      Assessment and plan: The site looks fine.  Drain can probably be removed tomorrow.  I will continue p.o. antibiotics for another 5 days, given the inflammation at operation and the depth of this wound and the prior radiation to the left breast.
(2) Pubic ramus fracture: 
      Problem details:
Newly discovered related to preop/pre-admission fall at home.
      Current Visit: Yes
      Status: Acute
      Assessment and plan: Plan Per Dr. Constantino.  Will require at least temporary skilled nursing home placement to rehab this hip.  Patient has a home with stairs and is unable to walk and lives alone.
Postoperative
Procedures

Operation Date: 06/14/18 15:15
Actual Procedures Side Surgeon
p Excision Breast Cyst Left Preston Shi MD


Postoperative day: 1
Time Spent With Patient
25 - 35 minutes

Quality
VTE
Deep Vein Thrombosis/Pulmonary Embolism Present on Admission: No

## 2018-06-15 NOTE — P.CONS_ITS
"History of Present Illness    
Date Patient Seen: 06/15/18    
Time Patient Seen: 13:35    
Chief complaint: excision left breast [id number]    
Reason for consult: Two recent falls at home unable to ambulate complaining of   
right hip pain    
Requesting provider: Preston Shi    
Narrative: She states that she fell 3 days ago while walking outside to deliver   
a letter to her neighbor.  She is unsure why she fell.  She denies a loss of   
conscious and states she looked around for which she may have tripped over but   
did not find any thing.  She is a community ambulator without assistive   
devices.  She does have a cane--but never uses it. She had a recent DEXA scan   
of her lumbar spine that was within normal limits.  She takes 5000 units of   
vitamin-D daily.  She has a history of spinal stenosis, and lumbar arthritis.   
She denies any heart or lung problems.  She is not on any blood thinners.  She   
does not smoke or use tobacco products.    
Location:  Right hip--groin pain and pain on the outside of the hip    
Quality:  Aching    
Severity:  Moderate unable to ambulate without assist, but pain well controlled   
at rest sitting in a chair or bed    
Duration:  3 days    
Contact:  The patient had 2 falls:  The 1st was 3 days ago walking outside to   
deliver a letter.  She crawled back inside, and fell again trying to get up a   
stair.  She then got into bed for the rest of the day.  Her daughter picked her   
up and brought her to her house the next day, but had to assist her walking to   
the car.  She has not ambulated independently since the 1st fall.  She was   
brought to the emergency room last night and unable to ambulate.  X-rays were   
taken of the patient's lumbar spine and right hip that did not show an obvious   
fracture. She was then brought to the operating room for a previously scheduled   
breast biopsy/cyst excision.  She was admitted after the surgery, and given her   
continued right hip pain and inability to ambulate orthopedic surgery was   
consulted.  The patient denies any previous injury to the hip. She does have a   
history of spinal stenosis.  He has no history of diabetes.    
    
[facility]    
Medical History (Reviewed 06/15/18 @ 13:36 by Eusebia Constantino MD)    
    
Anxiety (Acute)    
Bakers cyst (Acute)    
Bruises easily (Acute)    
History of UTI (Acute)    
Hypothyroid (Acute)    
Impaired vision (Acute)    
Spinal stenosis (Acute)    
    
    
Surgical History (Reviewed 06/15/18 @ 13:36 by Eusebia Constantino MD)    
    
History of colonoscopy (Acute)    
History of esophagogastroduodenoscopy (EGD) (Acute)    
Status post breast lumpectomy    
Status post hysterectomy with oophorectomy    
    
    
Family History (Reviewed 06/15/18 @ 13:36 by Eusebia Constantino MD)    
Sister  Age: 75 Osteoporosis    
    
    
Social History (Reviewed 06/15/18 @ 13:36 by Eusebia Constantino MD)    
household members:  none    
    
Smoking Status:  Never smoker    
    
alcohol intake:  current    
    
    
    
    
Meds    
 Home Medications    
    
    
    
 Medication  Instructions  Recorded  Confirmed  Type    
     
hydrochlorothiazide 50 mg PO QDAY #0 12/06/16 06/14/18 History    
     
cholecalciferol (vitamin D3) 5,000 unit PO DAILY 06/06/18 06/14/18 History    
    
[Vitamin D3]        
     
doxepin 25 mg PO BEDTIME 06/06/18 06/14/18 History    
     
hydrocodone-acetaminophen 1 - 2 tab PO DAILY PRN 06/06/18 06/14/18 History    
     
omeprazole 20 mg PO DAILY 06/06/18 06/14/18 History    
     
sucralfate 1 g PO QID 06/06/18 06/14/18 History    
     
fluticasone 1 spray INTRANASAL DAILY 06/14/18 06/14/18 History    
     
latanoprost 1 drp OPHTHALMIC (EYE) BEDTIME 06/14/18 06/14/18 History    
     
levothyroxine 100 mcg PO DAILY 06/14/18 06/14/18 History    
     
meclizine 25 mg PO Q8H PRN 06/14/18 06/14/18 History    
     
meloxicam 1 tab PO DAILY 06/14/18 06/14/18 History    
[id number]|2018-06-19 08:02:00|2018-06-19 07:57:00|P.PN_ITS|[facility]|Health Information Management|[id number]|"Subjective

## 2018-06-16 VITALS
RESPIRATION RATE: 16 BRPM | OXYGEN SATURATION: 91 % | TEMPERATURE: 97.88 F | DIASTOLIC BLOOD PRESSURE: 74 MMHG | HEART RATE: 78 BPM | SYSTOLIC BLOOD PRESSURE: 145 MMHG

## 2018-06-16 VITALS
SYSTOLIC BLOOD PRESSURE: 118 MMHG | TEMPERATURE: 98.42 F | OXYGEN SATURATION: 92 % | DIASTOLIC BLOOD PRESSURE: 59 MMHG | RESPIRATION RATE: 18 BRPM | HEART RATE: 85 BPM

## 2018-06-16 VITALS — OXYGEN SATURATION: 92 %

## 2018-06-16 VITALS
SYSTOLIC BLOOD PRESSURE: 109 MMHG | HEART RATE: 84 BPM | TEMPERATURE: 98.78 F | RESPIRATION RATE: 18 BRPM | OXYGEN SATURATION: 91 % | DIASTOLIC BLOOD PRESSURE: 64 MMHG

## 2018-06-16 VITALS
SYSTOLIC BLOOD PRESSURE: 136 MMHG | HEART RATE: 87 BPM | DIASTOLIC BLOOD PRESSURE: 80 MMHG | OXYGEN SATURATION: 91 % | TEMPERATURE: 98.06 F | RESPIRATION RATE: 18 BRPM

## 2018-06-16 VITALS — OXYGEN SATURATION: 94 %

## 2018-06-16 VITALS — OXYGEN SATURATION: 95 %

## 2018-06-16 VITALS — RESPIRATION RATE: 20 BRPM

## 2018-06-16 VITALS — OXYGEN SATURATION: 93 %

## 2018-06-16 RX ADMIN — ENOXAPARIN SODIUM 40 MG: 40 INJECTION SUBCUTANEOUS at 08:49

## 2018-06-16 RX ADMIN — MORPHINE SULFATE 4 MG: 4 INJECTION, SOLUTION INTRAMUSCULAR; INTRAVENOUS at 14:08

## 2018-06-16 RX ADMIN — OXYCODONE AND ACETAMINOPHEN 2 TAB: 5; 325 TABLET ORAL at 16:00

## 2018-06-16 RX ADMIN — DEXTROSE AND SODIUM CHLORIDE 125 ML: 5; 450 INJECTION, SOLUTION INTRAVENOUS at 11:42

## 2018-06-16 RX ADMIN — CEFAZOLIN SODIUM 200 GM: 2 INJECTION, SOLUTION INTRAVENOUS at 18:30

## 2018-06-16 RX ADMIN — OXYCODONE AND ACETAMINOPHEN 2 TAB: 5; 325 TABLET ORAL at 21:19

## 2018-06-16 RX ADMIN — MORPHINE SULFATE 4 MG: 4 INJECTION, SOLUTION INTRAMUSCULAR; INTRAVENOUS at 18:19

## 2018-06-16 RX ADMIN — PANTOPRAZOLE SODIUM 40 MG: 40 TABLET, DELAYED RELEASE ORAL at 08:50

## 2018-06-16 RX ADMIN — GABAPENTIN 300 MG: 300 CAPSULE ORAL at 21:20

## 2018-06-16 RX ADMIN — GABAPENTIN 300 MG: 300 CAPSULE ORAL at 08:50

## 2018-06-16 RX ADMIN — LEVOTHYROXINE SODIUM 100 MCG: 75 TABLET ORAL at 06:23

## 2018-06-16 RX ADMIN — DEXTROSE AND SODIUM CHLORIDE 125 ML: 5; 450 INJECTION, SOLUTION INTRAVENOUS at 22:03

## 2018-06-16 RX ADMIN — CEFAZOLIN SODIUM 200 GM: 2 INJECTION, SOLUTION INTRAVENOUS at 03:18

## 2018-06-16 RX ADMIN — OXYCODONE AND ACETAMINOPHEN 2 TAB: 5; 325 TABLET ORAL at 09:52

## 2018-06-16 RX ADMIN — DOXEPIN HYDROCHLORIDE 25 MG: 25 CAPSULE ORAL at 21:20

## 2018-06-16 RX ADMIN — LATANOPROST 1 DROPS: 50 SOLUTION OPHTHALMIC at 21:21

## 2018-06-16 RX ADMIN — ONDANSETRON 8 MG: 4 TABLET, ORALLY DISINTEGRATING ORAL at 19:28

## 2018-06-16 RX ADMIN — MIRTAZAPINE 30 MG: 15 TABLET, FILM COATED ORAL at 21:21

## 2018-06-16 RX ADMIN — MELOXICAM 7.5 MG: 7.5 TABLET ORAL at 08:50

## 2018-06-16 RX ADMIN — DEXTROSE AND SODIUM CHLORIDE 125 ML: 5; 450 INJECTION, SOLUTION INTRAVENOUS at 01:55

## 2018-06-16 RX ADMIN — POTASSIUM CHLORIDE 20 MEQ: 1500 TABLET, EXTENDED RELEASE ORAL at 13:43

## 2018-06-16 RX ADMIN — OXYCODONE AND ACETAMINOPHEN 2 TAB: 5; 325 TABLET ORAL at 03:18

## 2018-06-16 RX ADMIN — CEFAZOLIN SODIUM 200 GM: 2 INJECTION, SOLUTION INTRAVENOUS at 11:43

## 2018-06-16 NOTE — PM.PN.1
Subjective
Date Patient Seen: 06/15/18
Time Patient Seen: 18:31
Interval history: Asked to follow patient admitted with a unusual set of circumstances.  Patient had been scheduled for a breast mass removal today or written yesterday and in the interim in the last 24 hr had fallen and was unable to bear weight.  
Presented to the emergency room was worked up felt like there was no x-ray evidence of fracture of pelvis or hip and there are able to let her leave the ER go up and have her surgical procedure.  At the end of that in recovery she was completely 
unable to bear weight or stand and further evaluation by surgery team with the CT scan showed a nondisplaced pelvic fractures in 2 places.  Patient is anxious with some mild dementia and was not agreeable at that point to being in a care facility 
for rehabilitation and pain control.  Has history of anxiety I think some mildly progressive dementia hypothyroidism

Exam
Vital Signs
(past 8 hours):  Vital Signs - 8 hr

 06/16/18
06:30 06/16/18
07:35 06/16/18
10:12
Temperature  98.7 F 
Pulse Rate  84 
Respiratory Rate  18 
Blood Pressure  109/64 
Pulse Oximetry 95 91 94



Pulse Oximetry                 94
Oxygen Delivery Method         Room Air
Oxygen Flow Rate               0


Narrative
Exam Narrative: Patient sitting alert in bed recognizes me instantly very clear communication and accurate history
PERRLA EOMs intact.  Neck supple without thyromegaly or mass.  Lungs clear to auscultation and percussion.  Cardiovascular shows regular rate and rhythm without murmur or S3 or significant edema. Abdomen nontender no hepatosplenomegaly mass or 
rebound.  Neuro shows symmetrical sensory motor function speech clear hip.  Discomfort but no localized to the pelvic bones.  CT review with Ortho indicates no surgery required pain management primarily

Objective
Labs

Result Diagrams: 
06/15/18 05:58          

06/15/18 18:35          

Labs:  Laboratory Results - last 24 hr

  06/15/18
  18:35
Sodium  131 L
Potassium  3.1 L
Chloride  94 L
Carbon Dioxide  30
BUN  13
Creatinine  0.50 L
Estimated GFR  > 60.0
BUN/Creatinine Ratio  26.0 H
Glucose  135 H
Calcium  8.6



Assessment & Plan
Plan: 
      Assessment/Plan Narrative: 
Assessment 1.  Fall with nondisplaced fracture of superior and inferior pelvic rami on the right side.  Hip is without fracture patient having significant pain management issues.  Will move oral pain meds closer together continue with backup but may 
lower that dose.  Will go ahead and activate PT and OT for help with mobilization at this point.  Anticipate that patient will need a couple of days to get to a point where she is safe enough to participate in therapy or rehab.  We will be adjusting 
pain medicines and also IV fluids given her hypokalemia.

Assessment 2.  Hypokalemia.  We will administer potassium today and consistently looked really and will relook at labs for tomorrow.

Assessment 3.  Hypothyroidism with one-word pending labs will recheck TSH for stability.

Assessment 4.  Mild confusion and dementia predating this.  I think mild-to-moderate forgetfulness but functions pretty well and independently at home will anticipate return to that

Assessment 5.  Anxiety patient does have some anxiety I think it is fairly manageable and she has very good support group and the community.

Quality
VTE
Deep Vein Thrombosis/Pulmonary Embolism Present on Admission: No

## 2018-06-16 NOTE — OT.IP.TRT
Current Diagnoses

Sebaceous cyst (06/15/18)
Other benign mammary dysplasias of left breast (06/15/18)
Other specified fracture of unspecified pubis, initial encounter for closed fracture (06/15/18)

Surgery Performed

Operation Date: 06/14/18 15:15
Actual Procedures
p Excision Breast Cyst(Left) - Preston Shi MD

Occupational Therapy Treatment Note

M3 OT- IP Subjective and Pain                              Start:  06/15/18 15:17
Freq:                                                      Status: Active        
Protocol:                                                                        
 Document     06/16/18 12:01  CCC  (Rec: 06/16/18 12:03  CCC  [id number])
 OT- Subjective
     Occupational Therapy Visit Type
      Type                                       Administrative Note
      Notes                                      Still waiting for
                                                 clarification of weight
                                                 bearing and status for pt.
                                                 Therefore, to eval pt later or
                                                 Monday.

## 2018-06-16 NOTE — CM.DPC
DCP Cont: 

Updated Dr Cobb this morning; pt ready from Medicare standpoint for DC to SNF Monday 6/18/18. 

It has been confirmed that pt has a pelvic fx that needs to heal, no surgery indicated at this time. Met w/pt, her dtr Lupis, and dtr Heydi (lives near [place]) P# [phone]. Reviewed DCP and DC recommendation for SNF. 

Pt reluctant but now agreeable to SNF stay before return home and would like [facility]. 

Placed referral to [facility] for Monday and completed PASSR. Awaiting confirmation re acceptance and bed availability. Please keep dtr Lupis updated. 

ALBARO Duran

## 2018-06-16 NOTE — PT.IPTN
Current Diagnoses

Sebaceous cyst (06/15/18)
Other benign mammary dysplasias of left breast (06/15/18)
Other specified fracture of unspecified pubis, initial encounter for closed fracture (06/15/18)

Surgery Performed

Operation Date: 06/14/18 15:15
Actual Procedures
p Excision Breast Cyst(Left) - Preston Shi MD

Physical Therapy Treatment Note

M2 PT-IP Current Condition                                 Start:  06/15/18 12:22
Freq:   AS NEEDED                                          Status: Active        
Protocol:                                                                        
 Document     06/16/18 14:38  AB  (Rec: 06/16/18 14:40  AB  [id number])
 Physical Therapy Current Condition
     Current Condition
      Evaluation Date                            06/15/18
      Treatment Diagnosis                        s/p L lumpectomy
      Onset Date                                 06/14/18
     Weight Bearing Status
      Weight Bearing Status                      Weight Bear as Tolerated
      Allowed Weight Bearing Amount (enter %     Per Dr. Constantino's addendum
       or #) (%)                                 note after MRI result: pt WBAT
                                                 on RLE.
M3 PT-IP Subjective                                        Start:  06/15/18 12:22
Freq:   AS NEEDED                                          Status: Active        
Protocol:                                                                        
 Document     06/16/18 14:34  AB  (Rec: 06/16/18 14:36  AB  [id number])
 Subjective
     Physical Therapy Visit Type
      Type                                       Patient Refusal
      Notes                                      checked on pt and pt refused
                                                 therapy.  stated that she is
                                                 tired and want to rest and
                                                 that she has been up for lunch
                                                 .  Nurse stated that pt c/o
                                                 pain 12/10 and was just given
                                                 morphine IV.

## 2018-06-16 NOTE — OT.IP.TRT
Current Diagnoses

Sebaceous cyst (06/15/18)
Other benign mammary dysplasias of left breast (06/15/18)
Other specified fracture of unspecified pubis, initial encounter for closed fracture (06/15/18)

Surgery Performed

Operation Date: 06/14/18 15:15
Actual Procedures
p Excision Breast Cyst(Left) - Preston Shi MD

Occupational Therapy Treatment Note

M3 OT- IP Subjective and Pain                              Start:  06/15/18 15:17
Freq:                                                      Status: Active        
Protocol:                                                                        
 Document     06/16/18 14:33  CCC  (Rec: 06/16/18 14:34  CCC  [id number])
 OT- Subjective
     Occupational Therapy Visit Type
      Type                                       Patient Refusal
      Notes                                      Pt states too tired and not
                                                 wanting to do OT eval at this
                                                 time. Therefore OT eval on
                                                 Monday as no OT on Sunday.

## 2018-06-16 NOTE — PM.PN.1
Subjective
Date Patient Seen: 06/16/18
Time Patient Seen: 13:52
Interval history: Patient of of in is clear today.  Continues to struggle with pain.  Very limited movement and needing assist even for back bedside commode.  Will an active physical therapy and OT today.  Low K and has gotten some potassium from 
surgeons will continue that on a consistent basis and monitor labs for tomorrow.  She is agreeable now to this point for rehabilitation for a short period time to get pain control physical therapy stability and balance rehab initiated.  Family is in 
agreement with that and met with patient as well as family and they had all their questions answered.

Exam
Vital Signs
(past 8 hours):  Vital Signs - 8 hr

 06/16/18
06:30 06/16/18
07:35 06/16/18
10:12
Temperature  98.7 F 
Pulse Rate  84 
Respiratory Rate  18 
Blood Pressure  109/64 
Pulse Oximetry 95 91 94



Pulse Oximetry                 94
Oxygen Delivery Method         Room Air
Oxygen Flow Rate               0


Narrative
Exam Narrative: Patient is pleasant alert recognizes me come more comfortably lengthy visit today about the rehabilitation and physical therapy bowels rehab for venting falls and she is on board with our plan. Vital signs are stable at this point PE 
are RO a EOMs intact lungs clear to auscultation percussion heart shows regular rate and rhythm without murmur is 3 abdomen soft nontender no hepatosplenomegaly or mass decreased movement of right lower extremity secondary to discomfort in the 
pelvis area speech clear.

Objective
Labs

Result Diagrams: 
06/15/18 05:58          

06/15/18 18:35          

Labs:  Laboratory Results - last 24 hr

  06/15/18
  18:35
Sodium  131 L
Potassium  3.1 L
Chloride  94 L
Carbon Dioxide  30
BUN  13
Creatinine  0.50 L
Estimated GFR  > 60.0
BUN/Creatinine Ratio  26.0 H
Glucose  135 H
Calcium  8.6



Assessment & Plan
Plan: 
      Assessment/Plan Narrative: 
Assessment 1.  Pelvic fracture following fall at home.  Inferior and superior pelvic rami on the side fracture but nondisplaced seems fairly stable and primarily comfort management and and get ability patient with ability to be able to stand and 
move in care of herself without full assistance

Assessment 2.  Anxiety patient is coming now that she has had time to review these issues and her regular medications I think are adequate for this.

Assessment 3.  Hypokalemia.  Will supplement the potassium starting today.

Assessment 4.  Pain management will increase pain med frequency for the oral see if we can get head pain and we need I then IV med we can do that at a lower dose possibly consider PCA.

Assessment 5.  Hypothyroid stable at this point will continue on her regular meds

Quality
VTE
Deep Vein Thrombosis/Pulmonary Embolism Present on Admission: No

## 2018-06-17 VITALS — OXYGEN SATURATION: 93 %

## 2018-06-17 VITALS
HEART RATE: 75 BPM | OXYGEN SATURATION: 95 % | DIASTOLIC BLOOD PRESSURE: 99 MMHG | TEMPERATURE: 97.88 F | RESPIRATION RATE: 16 BRPM | SYSTOLIC BLOOD PRESSURE: 134 MMHG

## 2018-06-17 VITALS
RESPIRATION RATE: 18 BRPM | TEMPERATURE: 97.34 F | OXYGEN SATURATION: 98 % | HEART RATE: 86 BPM | SYSTOLIC BLOOD PRESSURE: 149 MMHG | DIASTOLIC BLOOD PRESSURE: 62 MMHG

## 2018-06-17 VITALS
OXYGEN SATURATION: 95 % | TEMPERATURE: 97.16 F | RESPIRATION RATE: 20 BRPM | SYSTOLIC BLOOD PRESSURE: 120 MMHG | DIASTOLIC BLOOD PRESSURE: 88 MMHG | HEART RATE: 83 BPM

## 2018-06-17 VITALS
DIASTOLIC BLOOD PRESSURE: 69 MMHG | RESPIRATION RATE: 16 BRPM | HEART RATE: 81 BPM | OXYGEN SATURATION: 89 % | TEMPERATURE: 97.34 F | SYSTOLIC BLOOD PRESSURE: 146 MMHG

## 2018-06-17 VITALS
SYSTOLIC BLOOD PRESSURE: 126 MMHG | HEART RATE: 78 BPM | TEMPERATURE: 97 F | DIASTOLIC BLOOD PRESSURE: 60 MMHG | OXYGEN SATURATION: 96 % | RESPIRATION RATE: 18 BRPM

## 2018-06-17 VITALS
HEART RATE: 81 BPM | RESPIRATION RATE: 16 BRPM | DIASTOLIC BLOOD PRESSURE: 74 MMHG | OXYGEN SATURATION: 91 % | TEMPERATURE: 97.52 F | SYSTOLIC BLOOD PRESSURE: 153 MMHG

## 2018-06-17 VITALS — OXYGEN SATURATION: 87 %

## 2018-06-17 VITALS
SYSTOLIC BLOOD PRESSURE: 154 MMHG | DIASTOLIC BLOOD PRESSURE: 72 MMHG | HEART RATE: 86 BPM | RESPIRATION RATE: 24 BRPM | TEMPERATURE: 97.9 F | OXYGEN SATURATION: 93 %

## 2018-06-17 VITALS — RESPIRATION RATE: 16 BRPM

## 2018-06-17 VITALS — OXYGEN SATURATION: 95 %

## 2018-06-17 VITALS — OXYGEN SATURATION: 91 %

## 2018-06-17 VITALS — OXYGEN SATURATION: 94 %

## 2018-06-17 LAB
ADD MANUAL DIFF / SLIDE REVIEW: NO
ALBUMIN SERPL-MCNC: 3 G/DL (ref 3.5–5)
ALBUMIN/GLOB SERPL: 1 {RATIO} (ref 1–2.8)
ALP SERPL-CCNC: 55 U/L (ref 38–126)
ALT SERPL-CCNC: 39 IU/L (ref 9–52)
BUN SERPL-MCNC: 11 MG/DL (ref 7–17)
CALCIUM SERPL-MCNC: 8 MG/DL (ref 8.4–10.2)
CHLORIDE SERPL-SCNC: 92 MMOL/L (ref 98–107)
CO2 SERPL-SCNC: 29 MMOL/L (ref 22–32)
ESTIMATED GLOMERULAR FILT RATE: > 60 ML/MIN (ref 60–?)
GLOBULIN SER CALC-MCNC: 3 G/DL (ref 1.7–4.1)
GLUCOSE SERPL-MCNC: 127 MG/DL (ref 80–110)
HEMATOCRIT: 33.8 % (ref 36–46)
HEMOGLOBIN: 11.6 G/DL (ref 12–16)
HEMOLYSIS: < 15 (ref 0–50)
MCV RBC: 95 FL (ref 80–100)
MEAN CORPUSCULAR HEMOGLOBIN: 32.5 PG (ref 26–34)
MEAN CORPUSCULAR HGB CONC: 34.2 % (ref 30–36)
PLATELET COUNT: 225 X10^3/UL (ref 150–400)
POTASSIUM SERPL-SCNC: 3.3 MMOL/L (ref 3.4–5.1)
PROT SERPL-MCNC: 6 G/DL (ref 6.3–8.2)
SODIUM SERPL-SCNC: 129 MMOL/L (ref 137–145)

## 2018-06-17 RX ADMIN — LEVOTHYROXINE SODIUM 100 MCG: 75 TABLET ORAL at 06:09

## 2018-06-17 RX ADMIN — PANTOPRAZOLE SODIUM 40 MG: 40 TABLET, DELAYED RELEASE ORAL at 09:23

## 2018-06-17 RX ADMIN — OXYCODONE AND ACETAMINOPHEN 2 TAB: 5; 325 TABLET ORAL at 01:12

## 2018-06-17 RX ADMIN — MELOXICAM 7.5 MG: 7.5 TABLET ORAL at 09:23

## 2018-06-17 RX ADMIN — CEFAZOLIN SODIUM 200 GM: 2 INJECTION, SOLUTION INTRAVENOUS at 12:21

## 2018-06-17 RX ADMIN — OXYCODONE AND ACETAMINOPHEN 2 TAB: 5; 325 TABLET ORAL at 18:40

## 2018-06-17 RX ADMIN — LATANOPROST 1 DROPS: 50 SOLUTION OPHTHALMIC at 21:32

## 2018-06-17 RX ADMIN — CEFAZOLIN SODIUM 200 GM: 2 INJECTION, SOLUTION INTRAVENOUS at 02:48

## 2018-06-17 RX ADMIN — ENOXAPARIN SODIUM 40 MG: 40 INJECTION SUBCUTANEOUS at 09:20

## 2018-06-17 RX ADMIN — SODIUM CHLORIDE AND POTASSIUM CHLORIDE 100 MEQ: 9; 2.98 INJECTION, SOLUTION INTRAVENOUS at 19:36

## 2018-06-17 RX ADMIN — CEFAZOLIN SODIUM 200 GM: 2 INJECTION, SOLUTION INTRAVENOUS at 20:00

## 2018-06-17 RX ADMIN — DOCUSATE SODIUM 100 MG: 100 CAPSULE, LIQUID FILLED ORAL at 19:05

## 2018-06-17 RX ADMIN — GABAPENTIN 300 MG: 300 CAPSULE ORAL at 09:23

## 2018-06-17 RX ADMIN — DEXTROSE AND SODIUM CHLORIDE 125 ML: 5; 450 INJECTION, SOLUTION INTRAVENOUS at 05:24

## 2018-06-17 RX ADMIN — HYDROMORPHONE HYDROCHLORIDE 2 MG: 2 TABLET ORAL at 22:56

## 2018-06-17 RX ADMIN — OXYCODONE AND ACETAMINOPHEN 2 TAB: 5; 325 TABLET ORAL at 05:14

## 2018-06-17 RX ADMIN — FLUTICASONE PROPIONATE 1 SPRAY: 50 SPRAY, METERED NASAL at 09:23

## 2018-06-17 RX ADMIN — MORPHINE SULFATE 4 MG: 4 INJECTION, SOLUTION INTRAMUSCULAR; INTRAVENOUS at 02:58

## 2018-06-17 RX ADMIN — DOXEPIN HYDROCHLORIDE 25 MG: 25 CAPSULE ORAL at 21:33

## 2018-06-17 RX ADMIN — GABAPENTIN 300 MG: 300 CAPSULE ORAL at 21:33

## 2018-06-17 RX ADMIN — SODIUM CHLORIDE AND POTASSIUM CHLORIDE 100 MEQ: 9; 2.98 INJECTION, SOLUTION INTRAVENOUS at 09:22

## 2018-06-17 RX ADMIN — MIRTAZAPINE 30 MG: 15 TABLET, FILM COATED ORAL at 21:33

## 2018-06-17 RX ADMIN — MORPHINE SULFATE 2 MG: 4 INJECTION, SOLUTION INTRAMUSCULAR; INTRAVENOUS at 21:03

## 2018-06-17 NOTE — PT.IPTN
"
Current Diagnoses

Sebaceous cyst (06/15/18)
Other benign mammary dysplasias of left breast (06/15/18)
Other specified fracture of unspecified pubis, initial encounter for closed fracture (06/15/18)

Surgery Performed

Operation Date: 06/14/18 15:15
Actual Procedures
p Excision Breast Cyst(Left) - Preston Shi MD

Physical Therapy Treatment Note

M2 PT-IP Current Condition                                 Start:  06/15/18 12:22
Freq:   AS NEEDED                                          Status: Active        
Protocol:                                                                        
 Document     06/16/18 14:38  AB  (Rec: 06/16/18 14:40  AB  [id number])
 Physical Therapy Current Condition
     Current Condition
      Evaluation Date                            06/15/18
      Treatment Diagnosis                        s/p L lumpectomy
      Onset Date                                 06/14/18
     Weight Bearing Status
      Weight Bearing Status                      Weight Bear as Tolerated
      Allowed Weight Bearing Amount (enter %     Per Dr. Constantino's addendum
       or #) (%)                                 note after MRI result: pt WBAT
                                                 on RLE.
M3 PT-IP Subjective                                        Start:  06/15/18 12:22
Freq:   AS NEEDED                                          Status: Active        
Protocol:                                                                        
 Document     06/17/18 12:50  CLB  (Rec: 06/17/18 13:47  CLB  [id number])
 Subjective
     Physical Therapy Visit Type
      Type                                       Treatment Note
      Visit Start Time                           12:50
      Visit Stop Time                            15:15
      Total Visit Minutes                        25
      Number of PTA Visits                       2
     Physical Therapy Visit Comments
      Patient Comments                           Pt needing to use BSC and
                                                 wanting to get back to bed.
 Therapy Pain Assessment
     Pain
      When Pain Assessed                         During Mobility
     Pain Present
      Pain Present                               Pain Reported
M4 PT-IP Mobility and Gait                                 Start:  06/15/18 12:22
Freq:   AS NEEDED                                          Status: Active        
Protocol:                                                                        
 Document     06/17/18 12:50  CLB  (Rec: 06/17/18 13:47  CLB  [id number])
 PT-Bed Mobility Assessment
     Sit to Supine
      Sit to Supine                              Contact Guard Assistance
     Scooting
      Scooting Up and Down in Bed                Standby Assistance
 PT-Transfer Assessment
     Sit to and From Stand
      Sit to and from Stand                      Contact Guard Assistance
     Equipment
      Transfer Assistive Device                  Gait Belt
                                                 Front Wheeled Walker
     Transfers
      Transfer Destination                       Bed
                                                 Bedside Commode
      Transfer Technique                         Stand Pivot
     Transfer Ability
      Level of Assist                            Contact Guard Assistance
 Gait Assessment
     Gait
      Gait Assistance Required:                  Minimum Assistance
                                                 1 Person Assist
      Distance (Feet) (feet)                     5
      Able to Maintain Weight Bearing Status     Yes
       During Gait                               
     Assistive Devices
      Assistive Device                           Gait Belt
                                                 Front Wheeled Walker
     Gait Deviations
      General Gait Pattern                       Ataxic
                                                 Decreased S
[id number]|2018-06-14 18:26:23|2018-06-14 18:26:23|PM.PREOP||||"Pre-operative Note

## 2018-06-17 NOTE — PM.PN.1
"Subjective
Date Patient Seen: 06/17/18
Time Patient Seen: 10:47
Interval history: Patient a little bit groggy this morning.  Apparently required increased pain medicine through the night.  Has not been moving much and also is requiring a low oxygen this morning.  No signs of infection vital signs otherwise 
stable alert and was speaking clearly and this morning.  Seems to be in a little less discomfort she tells me she has having less pain and is scooting around in bed a little bit although still to 2 people to transfer her to a bedside commode.  
Physical therapy working with her this morning.  We will be working also on incentive spirometry and RT working with her to try to get those lungs inflated.  Also a surgery is giving her some potassium in her IV fluids.  Anticipate that these things 
all improving tomorrow and anticipate hopefully rehabilitation to transfer to skilled nursing tomorrow.

Exam
Vital Signs
(past 8 hours):  Vital Signs - 8 hr

 06/17/18
04:11 06/17/18
05:00 06/17/18
05:20
Temperature  97.4 F L 
Pulse Rate  81 
Respiratory Rate 16 16 
Blood Pressure  146/69 H 
Pulse Oximetry  89 L 87 L

 06/17/18
05:24 06/17/18
05:27 06/17/18
07:15
Temperature   97.5 F L
Pulse Rate   81
Respiratory Rate   16
Blood Pressure   153/74 H
Pulse Oximetry 91 94 91



Pulse Oximetry                 91
Oxygen Delivery Method         Nasal Cannula
Oxygen Flow Rate               1


Narrative
Exam Narrative: Patient sitting comfortably in bed with oxygen on by nasal cannula.  Speaking clearly cognitively functioning at her baseline.  PCR RLA EOMs intact.  Neck without mass.  Lungs clear to auscultation and percussion.  Cardiac shows 
regular rate and rhythm without murmur S3.  Trace edema. First incision site good per surgery.  Abdomen soft nontender no hepatosplenomegaly.  Neuro shows intact and symmetrical neuro motor and speech is clear.

Objective
Labs

Result Diagrams: 
06/17/18 04:56          

06/17/18 04:56          

Labs:  Laboratory Results - last 24 hr

  06/17/18 06/17/18
  04:56 04:56
WBC  7.6 
RBC  3.56 L 
Hgb  11.6 L 
Hct  33.8 L 
MCV  95.0 
MCH  32.5 
MCHC  34.2 
RDW  13.2 
Plt Count  225 
Neut % (Auto)  67.7 
Lymph % (Auto)  11.7 L 
Mono % (Auto)  12.2 
Eos % (Auto)  7.9 H 
Baso % (Auto)  0.5 
Neut # (Auto)  5100 
Sodium   129 L
Potassium   3.3 L
Chloride   92 L
Carbon Dioxide   29
BUN   11
Creatinine   0.60
Estimated GFR   > 60.0
BUN/Creatinine Ratio   18.3
Glucose   127 H
Calcium   8.0 L
Total Bilirubin   0.3
AST   79 H
ALT   39
Alkaline Phosphatase   55
Total Protein   6.0 L
Albumin   3.0 L
Globulin   3.0
Albumin/Globulin Ratio   1.0



Assessment & Plan
Plan: 
      Assessment/Plan Narrative: 
Assessment 1.  Fractured pelvis with superior and inferior pelvic rami fracture without displacement.  Still requiring 2 person assist even just to move from lying in bed to the bedside commode.  Requiring assistance with shifting in bed.  Not be 
ready for real ambulation I do not think it is.  Try to decrease medications I think she got a little over-sedated through the night so I have lowered her dose of her morphine if she has severe pain and have made changed her oxycodone to a  
interval and with as needed option.

Assessment 2.  Hypoxia this acute issue requiring supplementation.  I think it is combination of pain medication sedation and immobility.  Dr. Cobb he has a working on incentive spirometry with RT looking and will continue that.

Assessment 3.  Hypokalemia slightly improved with oral potassium yesterday and surgery has ordered IV potassium for for today will recheck lab in the morning and make sure that is improving.

Assessment 4.  Anemia stable with no evidence of bleeding will continue to monitor surgical wound doing well after large SOB cyst removal.

Assessment 5 mild dementia seems stable at this point I think a little bit of sedation from pain meds.

Assessment 6.  Hypertension as inad
[id number]|2018-06-19 11:47:18|2018-06-19 11:47:18|PM.DS.1||||"History of Present Illness

## 2018-06-17 NOTE — PT.IPTN
Current Diagnoses

Sebaceous cyst (06/15/18)
Other benign mammary dysplasias of left breast (06/15/18)
Other specified fracture of unspecified pubis, initial encounter for closed fracture (06/15/18)

Surgery Performed

Operation Date: 06/14/18 15:15
Actual Procedures
p Excision Breast Cyst(Left) - Preston Shi MD

Physical Therapy Treatment Note

M2 PT-IP Current Condition                                 Start:  06/15/18 12:22
Freq:   AS NEEDED                                          Status: Active        
Protocol:                                                                        
 Document     06/16/18 14:38  AB  (Rec: 06/16/18 14:40  AB  [id number])
 Physical Therapy Current Condition
     Current Condition
      Evaluation Date                            06/15/18
      Treatment Diagnosis                        s/p L lumpectomy
      Onset Date                                 06/14/18
     Weight Bearing Status
      Weight Bearing Status                      Weight Bear as Tolerated
      Allowed Weight Bearing Amount (enter %     Per Dr. Constantino's addendum
       or #) (%)                                 note after MRI result: pt WBAT
                                                 on RLE.
M3 PT-IP Subjective                                        Start:  06/15/18 12:22
Freq:   AS NEEDED                                          Status: Active        
Protocol:                                                                        
 Document     06/17/18 10:36  CLB  (Rec: 06/17/18 11:19  CLB  [id number])
 Subjective
     Physical Therapy Visit Type
      Type                                       Treatment Note
      Visit Start Time                           10:36
      Visit Stop Time                            10:52
      Total Visit Minutes                        15
      Number of PTA Visits                       1
     Physical Therapy Visit Comments
      Patient Comments                           Pt agreed to transfer to chair
                                                 .
 Therapy Pain Assessment
     Pain
      When Pain Assessed                         During Mobility
     Pain Present
      Pain Present                               Pain Reported
     Location
      Rt Hip
       Intensity                                 8
       Scale Used                                Numeric (1 - 10)
M4 PT-IP Mobility and Gait                                 Start:  06/15/18 12:22
Freq:   AS NEEDED                                          Status: Active        
Protocol:                                                                        
 Document     06/17/18 10:36  CLB  (Rec: 06/17/18 11:19  CLB  [id number])
 PT-Bed Mobility Assessment
     Supine to Sit
       Supine to Sit                             Contact Guard Assistance
     Scooting
       Scooting to Edge of Bed                   Contact Guard Assistance
 PT-Transfer Assessment
     Sit to and From Stand
       Sit to and from Stand                     Contact Guard Assistance
     Equipment
       Transfer Assistive Device                 Gait Belt
                                                 Front Wheeled Walker
     Transfers
       Transfer Destination                      Chair
       Transfer Technique                        Stand Pivot
     Transfer Ability
       Level of Assist                           Moderate Assistance
 Gait Assessment
     Gait
       Gait Assistance Required:                 Moderate Assistance
                                                 1 Person Assist
       Distance (Feet) (feet)                    3
       Able to Maintain Weight Bearing Status    Yes
        During Gait                              
     Assistive Devices
       Assistive Device                          Front Wheeled Walker
     Gait Deviations
       General Gait Pattern                      Ataxic
                                                 Decrea
[id number]|2018-06-19 12:04:12|2018-06-19 12:04:12|OT.IP.FRANCISCO||||

## 2018-06-18 VITALS
DIASTOLIC BLOOD PRESSURE: 84 MMHG | OXYGEN SATURATION: 94 % | HEART RATE: 80 BPM | TEMPERATURE: 98.24 F | RESPIRATION RATE: 16 BRPM | SYSTOLIC BLOOD PRESSURE: 154 MMHG

## 2018-06-18 VITALS
OXYGEN SATURATION: 94 % | DIASTOLIC BLOOD PRESSURE: 59 MMHG | TEMPERATURE: 97 F | HEART RATE: 80 BPM | RESPIRATION RATE: 24 BRPM | SYSTOLIC BLOOD PRESSURE: 117 MMHG

## 2018-06-18 VITALS
DIASTOLIC BLOOD PRESSURE: 55 MMHG | SYSTOLIC BLOOD PRESSURE: 128 MMHG | RESPIRATION RATE: 16 BRPM | OXYGEN SATURATION: 97 % | HEART RATE: 79 BPM | TEMPERATURE: 98.3 F

## 2018-06-18 VITALS — OXYGEN SATURATION: 94 %

## 2018-06-18 VITALS
HEART RATE: 76 BPM | TEMPERATURE: 97.2 F | DIASTOLIC BLOOD PRESSURE: 78 MMHG | OXYGEN SATURATION: 96 % | SYSTOLIC BLOOD PRESSURE: 152 MMHG | RESPIRATION RATE: 18 BRPM

## 2018-06-18 VITALS — OXYGEN SATURATION: 96 %

## 2018-06-18 VITALS
DIASTOLIC BLOOD PRESSURE: 73 MMHG | SYSTOLIC BLOOD PRESSURE: 152 MMHG | OXYGEN SATURATION: 93 % | RESPIRATION RATE: 20 BRPM | TEMPERATURE: 97.88 F | HEART RATE: 88 BPM

## 2018-06-18 VITALS — OXYGEN SATURATION: 95 %

## 2018-06-18 LAB
ADD MANUAL DIFF / SLIDE REVIEW: NO
ALBUMIN SERPL-MCNC: 2.8 G/DL (ref 3.5–5)
ALBUMIN/GLOB SERPL: 1 {RATIO} (ref 1–2.8)
ALP SERPL-CCNC: 58 U/L (ref 38–126)
ALT SERPL-CCNC: 37 IU/L (ref 9–52)
BUN SERPL-MCNC: 10 MG/DL (ref 7–17)
CALCIUM SERPL-MCNC: 8.4 MG/DL (ref 8.4–10.2)
CHLORIDE SERPL-SCNC: 98 MMOL/L (ref 98–107)
CO2 SERPL-SCNC: 32 MMOL/L (ref 22–32)
ESTIMATED GLOMERULAR FILT RATE: > 60 ML/MIN (ref 60–?)
GLOBULIN SER CALC-MCNC: 2.8 G/DL (ref 1.7–4.1)
GLUCOSE SERPL-MCNC: 102 MG/DL (ref 80–110)
HEMATOCRIT: 31.8 % (ref 36–46)
HEMOGLOBIN: 10.9 G/DL (ref 12–16)
HEMOLYSIS: < 15 (ref 0–50)
MCV RBC: 94.9 FL (ref 80–100)
MEAN CORPUSCULAR HEMOGLOBIN: 32.5 PG (ref 26–34)
MEAN CORPUSCULAR HGB CONC: 34.3 % (ref 30–36)
PLATELET COUNT: 250 X10^3/UL (ref 150–400)
POTASSIUM SERPL-SCNC: 4.6 MMOL/L (ref 3.4–5.1)
PROT SERPL-MCNC: 5.6 G/DL (ref 6.3–8.2)
SODIUM SERPL-SCNC: 135 MMOL/L (ref 137–145)

## 2018-06-18 RX ADMIN — SODIUM CHLORIDE AND POTASSIUM CHLORIDE 100 MEQ: 9; 2.98 INJECTION, SOLUTION INTRAVENOUS at 07:25

## 2018-06-18 RX ADMIN — MELOXICAM 7.5 MG: 7.5 TABLET ORAL at 08:40

## 2018-06-18 RX ADMIN — OXYCODONE AND ACETAMINOPHEN 2 TAB: 5; 325 TABLET ORAL at 16:12

## 2018-06-18 RX ADMIN — GABAPENTIN 300 MG: 300 CAPSULE ORAL at 08:39

## 2018-06-18 RX ADMIN — GABAPENTIN 300 MG: 300 CAPSULE ORAL at 20:31

## 2018-06-18 RX ADMIN — MAGNESIUM HYDROXIDE 30 ML: 400 SUSPENSION ORAL at 08:39

## 2018-06-18 RX ADMIN — HYDROMORPHONE HYDROCHLORIDE 2 MG: 2 TABLET ORAL at 12:50

## 2018-06-18 RX ADMIN — HYDROMORPHONE HYDROCHLORIDE 2 MG: 2 TABLET ORAL at 06:14

## 2018-06-18 RX ADMIN — MIRTAZAPINE 30 MG: 15 TABLET, FILM COATED ORAL at 20:30

## 2018-06-18 RX ADMIN — LEVOTHYROXINE SODIUM 100 MCG: 75 TABLET ORAL at 06:14

## 2018-06-18 RX ADMIN — LATANOPROST 1 DROPS: 50 SOLUTION OPHTHALMIC at 20:30

## 2018-06-18 RX ADMIN — DOXEPIN HYDROCHLORIDE 25 MG: 25 CAPSULE ORAL at 20:30

## 2018-06-18 RX ADMIN — OXYCODONE AND ACETAMINOPHEN 2 TAB: 5; 325 TABLET ORAL at 02:39

## 2018-06-18 RX ADMIN — ENOXAPARIN SODIUM 40 MG: 40 INJECTION SUBCUTANEOUS at 09:24

## 2018-06-18 RX ADMIN — TRAZODONE HYDROCHLORIDE 100 MG: 100 TABLET ORAL at 20:31

## 2018-06-18 RX ADMIN — OXYCODONE AND ACETAMINOPHEN 2 TAB: 5; 325 TABLET ORAL at 08:38

## 2018-06-18 RX ADMIN — FLUTICASONE PROPIONATE 1 SPRAY: 50 SPRAY, METERED NASAL at 20:32

## 2018-06-18 RX ADMIN — CEFAZOLIN SODIUM 200 GM: 2 INJECTION, SOLUTION INTRAVENOUS at 03:17

## 2018-06-18 RX ADMIN — OXYCODONE AND ACETAMINOPHEN 2 TAB: 5; 325 TABLET ORAL at 23:52

## 2018-06-18 RX ADMIN — PANTOPRAZOLE SODIUM 40 MG: 40 TABLET, DELAYED RELEASE ORAL at 08:39

## 2018-06-18 NOTE — PM.PN.1
Subjective
Date Patient Seen: 06/18/18
Time Patient Seen: 10:24
Interval history: Pain better this morning.  She received oral Dilaudid in conjunction with oral Percocet last evening.  She has also received intravenous morphine within the last 24 hr.  No subjective fever or chills.  No chest pain. Denies any 
significant incisional pain at the left breast.  Tolerating a diet without difficulty. No nausea or vomiting.  Has had no bowel movement since admission.  Passing some flatus intermittently.  Denies shortness of breath but remains on 2 L nasal 
cannula oxygen which she does not normally required baseline.  Ryan catheter remains in place due to urinary retention likely secondary to narcotics and decreased mobility.

Exam
Vital Signs
(past 8 hours):  Vital Signs - 8 hr

 06/18/18
05:00 06/18/18
05:10 06/18/18
07:05
Temperature  97 F L 98.3 F
Pulse Rate  80 79
Respiratory Rate  24 16
Blood Pressure  117/59 L 128/55 H
Pulse Oximetry 94 94 97



Pulse Oximetry                 97
Oxygen Delivery Method         Nasal Cannula
Oxygen Flow Rate               2


Narrative
Exam Narrative: Well-nourished well-developed female sitting comfortably in bedside chair in no acute distress.  Alert oriented x3.  Remains on nasal cannula oxygen.
Has audible wheezing today but no crackles.  No productive cough.  She is able to perform incentive spirometry to approximately  1000 cc only.
Left breast dressing clean, dry, and intact
Abdomen soft, nondistended, nontender
Extremities show no significant edema

Objective
Labs

Result Diagrams: 
06/18/18 04:57          

06/18/18 04:57          

Labs:  Laboratory Results - last 24 hr

  06/18/18 06/18/18
  04:57 04:57
WBC  6.5 
RBC  3.35 L 
Hgb  10.9 L 
Hct  31.8 L 
MCV  94.9 
MCH  32.5 
MCHC  34.3 
RDW  13.3 
Plt Count  250 
Neut % (Auto)  65.0 
Lymph % (Auto)  11.8 L 
Mono % (Auto)  14.8 H 
Eos % (Auto)  7.8 H 
Baso % (Auto)  0.6 
Neut # (Auto)  4300 
Sodium   135 L
Potassium   4.6  D
Chloride   98
Carbon Dioxide   32
BUN   10
Creatinine   0.50 L
Estimated GFR   > 60.0
BUN/Creatinine Ratio   20.0
Glucose   102
Calcium   8.4
Total Bilirubin   0.3
AST   53 H
ALT   37
Alkaline Phosphatase   58
Total Protein   5.6 L
Albumin   2.8 L
Globulin   2.8
Albumin/Globulin Ratio   1.0

Portable chest x-ray still pending from this morning.

Assessment & Plan
Plan: 
      Assessment/Plan Narrative: 
78-year-old female with pelvic fracture causing significant narcotic pain requirement and decreased mobility which is compromising pulmonary status.  Will check chest x-ray as ordered to ensure she does not have significant pulmonary edema, but this 
is less likely.  Hep-Lock IV now.  Convert to oral antibiotics.  Continue narcotic regimen.  Discontinue Ryan catheter and give a trial of spontaneous urination.  If he is not able to do so and shows evidence of ongoing retention and she will have 
to have a Ryan catheter for a few more days at least.  Major issue remains the pulmonary status.  Continue to wean oxygen and perform aggressive pulmonary toilet.  Assist bowel function.  Orders were written for such.  I discussed all the above 
with the patient and the attending nurse today at the bedside.  All questions were answered to her satisfaction, and she voiced understanding.  I have also discussed the case with discharge coordinator.  At this point she is not quite ready for 
discharge to skilled nursing facility.  If she improves through the afternoon she may be able to transfer to [facility] as planned, but I anticipate she most likely will be here until at least tomorrow as an inpatient.  Again, patient made aware.

Quality
VTE
Deep Vein Thrombosis/Pulmonary Embolism Present on Admission: No

## 2018-06-18 NOTE — PT.IPTN
"
Current Diagnoses

Sebaceous cyst (06/15/18)
Other benign mammary dysplasias of left breast (06/15/18)
Other specified fracture of unspecified pubis, initial encounter for closed fracture (06/15/18)

Surgery Performed

Operation Date: 06/14/18 15:15
Actual Procedures
p Excision Breast Cyst(Left) - Preston Shi MD

Physical Therapy Treatment Note

M2 PT-IP Current Condition                                 Start:  06/15/18 12:22
Freq:   AS NEEDED                                          Status: Active        
Protocol:                                                                        
 Document     06/18/18 09:47  DLM  (Rec: 06/18/18 10:26  [facility]  [id number])
 Physical Therapy Current Condition
     Current Condition
      Evaluation Date                            06/15/18
      Treatment Diagnosis                        s/p L lumpectomy, pubis fx due
                                                 to fall, impaired gait
      Onset Date                                 06/14/18
     Weight Bearing Status
      Weight Bearing Status                      Weight Bear as Tolerated
      Allowed Weight Bearing Amount (enter %     Per Dr. Constantino's addendum
       or #) (%)                                 note after MRI result: pt WBAT
                                                 on RLE.
M3 PT-IP Subjective                                        Start:  06/15/18 12:22
Freq:   AS NEEDED                                          Status: Active        
Protocol:                                                                        
 Document     06/18/18 09:47  DLM  (Rec: 06/18/18 10:26  [facility]  [id number])
 Subjective
     Physical Therapy Visit Type
      Type                                       Treatment Note
      Visit Start Time                           09:00
      Visit Stop Time                            09:47
      Total Visit Minutes                        47
      Number of PTA Visits                       0
     Physical Therapy Visit Comments
      Patient Comments                           She is not sure what she can
                                                 do, hurts to move right LE
 Therapy Pain Assessment
     Pain
      When Pain Assessed                         During Mobility
     Pain Present
      Pain Present                               Pain Reported
     Location
      Rt Hip
       Intensity                                 6
       Scale Used                                Numeric (1 - 10)
       Description                               Aching
       Pain Behaviors                            Guarding
                                                 Holding Area
                                                 Wincing
       Pain Management Techniques                Re-positioning
M4 PT-IP Mobility and Gait                                 Start:  06/15/18 12:22
Freq:   AS NEEDED                                          Status: Active        
Protocol:                                                                        
 Document     06/18/18 09:47  DLM  (Rec: 06/18/18 10:26  [facility]  [id number])
 PT-Bed Mobility Assessment
     Supine to Sit
       Supine to Sit                             Moderate Assistance
     Scooting
       Scooting to Edge of Bed                   Standby Assistance
 PT-Transfer Assessment
     Sit to and From Stand
       Sit to and from Stand                     Minimal Assistance
     Equipment
       Transfer Assistive Device                 Gait Belt
                                                 Front Wheeled Walker
       Orthotic/Prosthetic Devices or Brace:     No
     Transfers
       Transfer Destination                      Chair
       Transfer Technique                        Stand Step Pivot
     Transfer Ability
       Level of Assist                           Minimal Assistance
     Comments
       Mobility Comments                         verbal cues for safe
              
[id number]|2018-06-14 21:17:26|2018-06-14 21:17:26|PC.NURSE||||"post op

## 2018-06-18 NOTE — OT.IP.TRT
"
Current Diagnoses

Sebaceous cyst (06/15/18)
Other benign mammary dysplasias of left breast (06/15/18)
Other specified fracture of unspecified pubis, initial encounter for closed fracture (06/15/18)

Surgery Performed

Operation Date: 06/14/18 15:15
Actual Procedures
p Excision Breast Cyst(Left) - Preston Shi MD

Occupational Therapy Treatment Note

M2 OT-IP Current Condition                                 Start:  06/15/18 15:17
Freq:                                                      Status: Active        
Protocol:                                                                        
 Document     06/18/18 13:48  ADH  (Rec: 06/18/18 13:53  ADH  [id number])
 Occupational Therapy Current Condition
     Current Condition
      Evaluation Date                            06/18/18
      Treatment Diagnosis                        6/18/18
      Diagnosis Onset Date                       6/18/18
     Weight Bearing Status
      Weight Bearing Status                      Weight Bear as Tolerated
      Allowed Weight Bearing Amount (enter %     Per Dr. Constantino's addendum
       or #) (%)                                 note after MRI result: pt WBAT
                                                 on RLE.
M3 OT- IP Subjective and Pain                              Start:  06/15/18 15:17
Freq:                                                      Status: Active        
Protocol:                                                                        
 Document     06/18/18 13:48  ADH  (Rec: 06/18/18 13:53  ADH  [id number])
 OT- Subjective
     Occupational Therapy Visit Type
      Type                                       Treatment Note
      Visit Start Time                           01:10
      Visit Stop Time                            01:45
      Total Visit Minutes                        35
 OT Pain Assessment
     Pain
      When Pain Assessed                         During Mobility
     Pain Present
      Pain Present                               Denied Pain
M4 OT- IP ADL's                                            Start:  06/15/18 15:17
Freq:                                                      Status: Active        
Protocol:                                                                        
 Document     06/18/18 13:48  ADH  (Rec: 06/18/18 13:53  ADH  [id number])
 OT ADL-Grooming
     General Evaluation
      Grooming Ability                           Contact Guard Assistance
      Areas Needing Assistance                   Retrieving/Set-up of Grooming
                                                 Items
 OT ADL-Dressing
     General Eval
      Lower Body Dressing Ability                Contact Guard Assistance
      Areas Needing Assistance                   Underpants/Brief
 OT ADL-Toileting
     General Evaluation
      Toileting Ability                          Moderate Assistance
      Areas Needing Assistance                   Perform Perineal Hygiene
     Devices
      Toileting Assistive Devices                Grab Bars
                                                 Raised Toilet Seat
     Comments
      OT Toileting Comments                      Pt with soiled hands after
                                                 attempt at posterior hygiene,
                                                 needing mod A for efficiency
                                                 and hygiene.
M6 OT- IP Functional Cognition                             Start:  06/15/18 15:17
Freq:                                                      Status: Active        
Protocol:                                                                        
 Document     06/18/18 13:48  ADH  (Rec: 06/18/18 13:53  ADH  [id number])
 Cognitive Factors Limiting Selfcare Function
     Cognitive Ability
      Level of Alertness                         Alert
      Patient Orientation                        Name
                                                 Age

[id number]|2018-06-15 13:35:15|2018-06-15 13:35:15|PM.CN||||"History of Present Illness

## 2018-06-19 VITALS
OXYGEN SATURATION: 95 % | SYSTOLIC BLOOD PRESSURE: 163 MMHG | TEMPERATURE: 97.88 F | HEART RATE: 92 BPM | DIASTOLIC BLOOD PRESSURE: 72 MMHG | RESPIRATION RATE: 17 BRPM

## 2018-06-19 VITALS
TEMPERATURE: 97.52 F | OXYGEN SATURATION: 94 % | RESPIRATION RATE: 21 BRPM | DIASTOLIC BLOOD PRESSURE: 83 MMHG | HEART RATE: 86 BPM | SYSTOLIC BLOOD PRESSURE: 131 MMHG

## 2018-06-19 VITALS — OXYGEN SATURATION: 94 %

## 2018-06-19 VITALS — OXYGEN SATURATION: 95 %

## 2018-06-19 VITALS — OXYGEN SATURATION: 92 %

## 2018-06-19 RX ADMIN — ENOXAPARIN SODIUM 40 MG: 40 INJECTION SUBCUTANEOUS at 09:05

## 2018-06-19 RX ADMIN — LEVOTHYROXINE SODIUM 100 MCG: 75 TABLET ORAL at 06:52

## 2018-06-19 RX ADMIN — PANTOPRAZOLE SODIUM 40 MG: 40 TABLET, DELAYED RELEASE ORAL at 09:05

## 2018-06-19 RX ADMIN — MELOXICAM 7.5 MG: 7.5 TABLET ORAL at 09:05

## 2018-06-19 RX ADMIN — HYDROMORPHONE HYDROCHLORIDE 2 MG: 2 TABLET ORAL at 10:31

## 2018-06-19 RX ADMIN — OXYCODONE AND ACETAMINOPHEN 2 TAB: 5; 325 TABLET ORAL at 06:16

## 2018-06-19 RX ADMIN — HYDROMORPHONE HYDROCHLORIDE 2 MG: 2 TABLET ORAL at 01:04

## 2018-06-19 RX ADMIN — Medication 650 MG: at 04:27

## 2018-06-19 RX ADMIN — GABAPENTIN 300 MG: 300 CAPSULE ORAL at 09:05

## 2018-06-19 NOTE — PM.PN.1
Subjective
Date Patient Seen: 06/19/18
Time Patient Seen: 07:57
Interval history: Patient is still having moderate amount of pain. Still having difficulty walking much but can get up and use the commode.  Feeling like pain is adequately controlled just difficult.  No other new changes or complaints.  No chest 
pain.  No shortness of breath.

Exam
Vital Signs
(past 8 hours):  Vital Signs - 8 hr

 06/19/18
00:18 06/19/18
00:30 06/19/18
04:29
Temperature 97.8 F  97.6 F
Pulse Rate 92 H  86
Respiratory Rate 17  21
Blood Pressure 163/72 H  131/83 H
Pulse Oximetry 95 95 94

 06/19/18
05:26
Temperature 
Pulse Rate 
Respiratory Rate 
Blood Pressure 
Pulse Oximetry 94



Pulse Oximetry                 94
Oxygen Delivery Method         Room Air
Oxygen Flow Rate               0


Narrative
Exam Narrative: Alert elderly female lying in bed in no acute distress.

Neck without significant adenopathy JVD. Lungs with slight basilar crackles.  Heart is regular rate and rhythm without murmur.  Abdomen is soft positive bowel sounds nontender. Extremities without edema. Neurologic exam she is sensing both legs.  No 
other change.

Objective
Labs

Result Diagrams: 
06/18/18 04:57          

06/18/18 04:57          


Assessment & Plan
Plan: 
      Assessment/Plan Narrative: 
Pelvic fracture.  As per surgeons.  Seems to be mobilizing.  I suspect this is going to take some time.  Certainly be a while before she is mobile without assistance.  Pain managed at this time appears to be adequate.  Orthopedics do not feel as if 
anything needs to be further done.  Will follow as outpatient.

Hypertension.  Stable at this time.

Hypokalemia.  Stable.  Dr. Scott will check his outpatient 2 weeks.

Hypothyroidism.  Stable.  Followed by Dr. Scott as outpatient.

Reflux.  Stable.  Continue current medicines.

Insomnia.  Continue trazodone and mirtazapine.

Disposition.  Discharge as per surgeons.  Dr. Scott will be happy to follow as primary at nursing home.  Follow up with Dr. Scott in 2-3 weeks.  Surgeons as per their discretion.


Quality
VTE
Deep Vein Thrombosis/Pulmonary Embolism Present on Admission: No

## 2018-06-19 NOTE — PT.IPTN
Current Diagnoses

Sebaceous cyst (06/15/18)
Other benign mammary dysplasias of left breast (06/15/18)
Other specified fracture of unspecified pubis, initial encounter for closed fracture (06/15/18)

Surgery Performed

Operation Date: 06/14/18 15:15
Actual Procedures
p Excision Breast Cyst(Left) - Preston Shi MD

Physical Therapy Treatment Note

M2 PT-IP Current Condition                                 Start:  06/15/18 12:22
Freq:   AS NEEDED                                          Status: Active        
Protocol:                                                                        
 Document     06/19/18 11:19  AB  (Rec: 06/19/18 11:24  AB  [id number])
 Physical Therapy Current Condition
     Current Condition
      Evaluation Date                            06/15/18
      Treatment Diagnosis                        s/p L lumpectomy, pubis fx due
                                                 to fall, impaired gait
      Onset Date                                 06/14/18
     Weight Bearing Status
      Weight Bearing Status                      Weight Bear as Tolerated
      Allowed Weight Bearing Amount (enter %     Per Dr. Constantino's addendum
       or #) (%)                                 note after MRI result: pt WBAT
                                                 on RLE.
M3 PT-IP Subjective                                        Start:  06/15/18 12:22
Freq:   AS NEEDED                                          Status: Active        
Protocol:                                                                        
 Document     06/19/18 11:19  AB  (Rec: 06/19/18 11:24  AB  [id number])
 Subjective
     Physical Therapy Visit Type
      Type                                       Treatment Note
      Visit Start Time                           09:53
      Visit Stop Time                            10:05
      Total Visit Minutes                        13
      Number of PTA Visits                       1
     Physical Therapy Visit Comments
      Patient Comments                           I feel ok but not 100%
 Therapy Pain Assessment
     Pain
      When Pain Assessed                         During Mobility
     Pain Present
      Pain Present                               Pain Reported
     Location
      Rt Hip
       Scale Used                                pain scale not stated
M4 PT-IP Mobility and Gait                                 Start:  06/15/18 12:22
Freq:   AS NEEDED                                          Status: Active        
Protocol:                                                                        
 Document     06/19/18 11:19  AB  (Rec: 06/19/18 11:24  AB  [id number])
 PT-Transfer Assessment
     Sit to and From Stand
       Sit to and from Stand                     Minimal Assistance
     Equipment
       Transfer Assistive Device                 Gait Belt
                                                 Front Wheeled Walker
 Gait Assessment
     Gait
       Gait Assistance Required:                 Minimum Assistance
       Distance (Feet) (feet)                    40
       Able to Maintain Weight Bearing Status    Yes
        During Gait                              
     Assistive Devices
       Assistive Device                          Gait Belt
                                                 Front Wheeled Walker
       Orthotic/Prosthetic Devices or Brace:     No
     Gait Deviations
       General Gait Pattern                      Antalgic
                                                 Decreased Stride Length
                                                 Decreased Feet Clearance
     Factors Limiting Gait Function
       Factors Limiting Gait Function            Decreased Activity Tolerance
                                                 Decreased Strength
                                                 Pain
                                                 Poor Balance
                         
[id number]|2018-06-15 12:35:29|2018-06-15 12:35:29|PT.IIE||||

## 2018-06-19 NOTE — CM.DPC
DCP/continued: Reviewed chart. Received notification from MD that patient okay to transfer to SNF today. Placed call to Ceci at [facility] she confirms that they can accept today. Orders and PASRR faxed to [facility]. Patient scheduled to be picked up at 
approximately 11:45AM. RN notified.

Met with patient to confirm d/c plan. Family at bedside. Patient aware and agreeable to plan.

P: [facility] today.  scheduled for 11:45am.

ALBARO Gaines

## 2018-06-19 NOTE — P.DS_ITS
"History of Present Illness    
Date Patient Seen: 06/19/18    
Time Patient Seen: 10:30    
Chief complaint: excision left breast [id number]    
    
Discharge Providers    
Date of admission: 06/15/18 10:22    
    
Primary care physician: Layton Scott MD    
    
Consults:      
    
06/14/18 20:50    
Consult to Discharge Planning Routine     
   Comment: Home health nursing.  Assisted-living placement    
Consult to Physical Therapy Evaluate & Treat     
   Comment: Recent fall.  Chronic back pain.    
   Physician Instructions: Evaluate and Treat    
Consult to  Routine     
   Comment: Benefit from placement.  Very resistant    
    
06/15/18 11:40    
Consult to Occupational Therapy Evaluate & Treat     
   Comment:     
   Physician Instructions: Evaluate and treat    
    
06/15/18 12:44    
Consult to Orthopedic Surgery Routine     
   Comment:     
   Consulting Provider: Eusebia Constantino    
   Reason for consultation: hip pain after fall.  cannot walk/support weight    
   Has provider been notified: Yes    
    
06/15/18 13:04    
Consult to Physician Routine     
   Comment:     
   Consulting Provider: Layton Scott    
   Reason for consultation: dementia, htn, placement issues    
   Has provider been notified: Yes    
    
    
    
Discharge provider: Preston Shi MD    
    
Discharge Date: 06/19/18    
    
Summary    
Discharge Diagnosis: Neglected chronic sebaceous cyst left breast with acute   
inflammation.    
Fall at home resulting in acute fractures of the pubic superior and inferior   
pubic ramus nondisplaced as well as a right sacral insufficiency fracture and a   
small right acetabular tear with small right hip effusion.    
    
Chronic essential hypertension    
    
Dementia chronic moderate.  Type not elucidated.    
Obesity with a BMI of 32.1 kilograms/meter squared    
Acute inability to bear weight right leg/hip secondary to acute fractures.    
Acute shortness of breath cause uncertain.    
Acute urinary retention resolved.  At transfer.    
Hospital Course: Patient was seen in the ER from whence she was discharged and   
brought to the operating room for a scheduled procedure. Large sebaceous cyst   
on her left breast was removed and sutured closed over a drain.  This was   
acutely inflamed and the she was begun on IV antibiotics postprocedure.  The   
patient could not ambulate after falling at home and therefore was admitted and   
further evaluated.  She was seen by Dr. Constantino of Orthopedics who ordered an   
MRI which showed multiple fractures listed above. These were all acute.  The   
patient was begun on PT and placed in a weight-bearing as tolerated status.    
She had walk with a walker.  She had some urinary retention apparently and   
required a Rayn which resolved.  The Ryan was removed. She also had some   
unusual shortness of breath but did not pan out to anything specific.  Chest x-  
ray was not remarkable.  She was ultimately discharged on oral medication on a   
general diet to [facility] and will follow up in the office for   
suture removal of her right breast biopsy.    
Status at Discharge    
Cognitive/behavioral status at discharge: Admit Ace line which is demented.    
Functional status at discharge: uses cane/walker (With assistance due to   
fracture)    
Overall status at discharge: patient is not back to baseline (Requires skilled   
nursing facility the and continued physical therapy.)    
Time Spent with Patient    
Less than 30 minutes    
    
Exam    
Vital Signs    
(past 8 hours):  Vital Signs - 8 hr    
    
 3    
    
 06/19/18    
04:29 06/19/18    
05:26 06/19/18    
09:00    
     
Temperature 97.6 F      
     
Pulse Rate 86      
     
Respiratory Rate 21      
     
Blood Pressure 131/83 H      
     
Pulse Oximetry 94 94 92    
    
    
 3    
    
 06/19/18    
09:15    
     
Temperature    
[id number]|2018-06-18 13:20:00|2018-06-18 13:14:00|MARVIN_ITS|MARK|Health Information Management|[id number]|"Subjective

## 2018-06-20 ENCOUNTER — HOSPITAL ENCOUNTER (OUTPATIENT)
Dept: HOSPITAL 73 - LAB | Age: 78
End: 2018-06-20
Payer: SELF-PAY

## 2018-06-20 VITALS — BODY MASS INDEX: 32.1 KG/M2

## 2018-06-20 DIAGNOSIS — R06.00: Primary | ICD-10-CM

## 2018-06-20 LAB
ADD MANUAL DIFF / SLIDE REVIEW: NO
HEMATOCRIT: 36.1 % (ref 36–46)
HEMOGLOBIN: 12.2 G/DL (ref 12–16)
MCV RBC: 95.3 FL (ref 80–100)
MEAN CORPUSCULAR HEMOGLOBIN: 32.2 PG (ref 26–34)
MEAN CORPUSCULAR HGB CONC: 33.8 % (ref 30–36)
PLATELET COUNT: 402 X10^3/UL (ref 150–400)

## 2018-06-20 PROCEDURE — 85025 COMPLETE CBC W/AUTO DIFF WBC: CPT

## 2018-07-05 ENCOUNTER — HOSPITAL ENCOUNTER (OUTPATIENT)
Age: 78
End: 2018-07-05
Payer: MEDICARE

## 2018-07-05 VITALS — BODY MASS INDEX: 32.1 KG/M2

## 2018-07-05 DIAGNOSIS — M25.551: ICD-10-CM

## 2018-07-05 DIAGNOSIS — M16.11: Primary | ICD-10-CM

## 2018-07-05 DIAGNOSIS — M51.36: ICD-10-CM

## 2018-07-05 PROCEDURE — 73502 X-RAY EXAM HIP UNI 2-3 VIEWS: CPT

## 2018-07-05 NOTE — DI.RAD.S_ITS
PROCEDURE:  XR HIP W PEL IF DONE RT 2V  
   
INDICATIONS:  hip pain, recent fall/frcatures  
   
TECHNIQUE:  AP pelvis with lateral view(s) of the right hip(s).    
   
COMPARISON:  [facility], , XR HIP W PEL IF DONE RT 2V, 6/14/2018, 13:23.  
   
FINDINGS:    
   
Bones:  No fractures or dislocations.  Pelvic ring appears intact.  No suspicious bony   
lesions.  Mild symmetric hip joint degeneration and degenerative disc and facet disease   
involving the lower lumbar spine.  
   
Soft tissues:  The visualized bowel gas pattern is normal.  No suspicious soft tissue   
calcifications.    
   
IMPRESSION:   
   
No displaced fracture seen.  If there is continued pain, followup exam or additional   
imaging such as MRI or CT could be performed for further assessment.  
   
Mild symmetric hip joint degeneration and degenerative change of the lower lumbar spine.  
   
   
Dictated by: Kash Allen [facility] Interpreted: Mason Marion MD on 7/05/2018 at 16:03       
Approved by: Mason Marion M.D. on 7/06/2018 at 10:42

## 2018-08-30 ENCOUNTER — HOSPITAL ENCOUNTER (OUTPATIENT)
Age: 78
End: 2018-08-30
Payer: MEDICARE

## 2018-08-30 VITALS — BODY MASS INDEX: 32.1 KG/M2

## 2018-08-30 DIAGNOSIS — R41.3: Primary | ICD-10-CM

## 2018-08-30 PROCEDURE — 70551 MRI BRAIN STEM W/O DYE: CPT

## 2018-08-30 NOTE — DI.MRI.S_ITS
PROCEDURE:  MR HEAD/BRAIN WO CON  
   
INDICATIONS:  MEMORY LOSS MEMORY DECLINE  
   
TECHNIQUE:    
Non-contrast axial T1 spin echo, axial T2 fast spin echo, sagittal and axial FLAIR,   
coronal T2 fast spin echo, axial gradient echo, axial diffusion and ADC through the   
brain.    
   
COMPARISON:  None.  
   
FINDINGS:    
Image quality:  Excellent.    
   
CSF spaces:  Ventricles appear symmetric in size and shape.  Basal cisterns are patent.    
No extra-axial fluid collections.    
   
Brain:  No intracranial bleeds or mass effects.  There is moderate to prominent cerebral   
volume loss.  There are periventricular and deep white matter chronic small vessel   
ischemic changes.  Brainstem appears normal.  Diffusion-weighted images show no acute   
ischemic insults.  No chronic ischemic insults.  Normal intravascular flow voids are   
present.    
   
Skull and face:  Calvarial bone marrow is normal in signal.  Orbits are normal.  Note is   
made of bilateral lens replacements.  
   
Sinuses:  Sinuses and mastoids are clear.    
   
   
IMPRESSION: Moderate to prominent brain parenchymal volume loss can be seen.  Chronic   
small vessel ischemic changes are seen.  
   
The appearance is similar to 2016.    
   
No focal abnormality is detected.    
   
Dictated by: Sinan Pacheco M.D. on 8/30/2018 at 11:01       
Approved by: Sinan Pacheco M.D. on 8/30/2018 at 11:03

## 2019-04-11 ENCOUNTER — HOSPITAL ENCOUNTER (OUTPATIENT)
Age: 79
End: 2019-04-11
Payer: MEDICARE

## 2019-04-11 VITALS — BODY MASS INDEX: 32.1 KG/M2

## 2019-04-11 DIAGNOSIS — Z12.31: Primary | ICD-10-CM

## 2019-04-11 PROCEDURE — 77067 SCR MAMMO BI INCL CAD: CPT

## 2019-04-11 PROCEDURE — 77063 BREAST TOMOSYNTHESIS BI: CPT

## 2019-07-10 ENCOUNTER — HOSPITAL ENCOUNTER (OUTPATIENT)
Age: 79
End: 2019-07-10
Payer: MEDICARE

## 2019-07-10 VITALS — BODY MASS INDEX: 32.1 KG/M2

## 2019-07-10 DIAGNOSIS — R10.9: ICD-10-CM

## 2019-07-10 DIAGNOSIS — N39.0: Primary | ICD-10-CM

## 2019-07-10 PROCEDURE — 87077 CULTURE AEROBIC IDENTIFY: CPT

## 2019-07-10 PROCEDURE — 87086 URINE CULTURE/COLONY COUNT: CPT

## 2019-07-10 PROCEDURE — 87186 SC STD MICRODIL/AGAR DIL: CPT

## 2020-05-29 ENCOUNTER — HOSPITAL ENCOUNTER (EMERGENCY)
Age: 80
Discharge: HOME | End: 2020-05-29
Payer: MEDICARE

## 2020-05-29 VITALS
SYSTOLIC BLOOD PRESSURE: 233 MMHG | RESPIRATION RATE: 20 BRPM | DIASTOLIC BLOOD PRESSURE: 100 MMHG | OXYGEN SATURATION: 99 % | TEMPERATURE: 97.52 F | HEART RATE: 80 BPM

## 2020-05-29 VITALS — OXYGEN SATURATION: 96 % | DIASTOLIC BLOOD PRESSURE: 77 MMHG | SYSTOLIC BLOOD PRESSURE: 170 MMHG | HEART RATE: 62 BPM

## 2020-05-29 VITALS — HEART RATE: 84 BPM | SYSTOLIC BLOOD PRESSURE: 172 MMHG | DIASTOLIC BLOOD PRESSURE: 75 MMHG

## 2020-05-29 VITALS — BODY MASS INDEX: 32.1 KG/M2

## 2020-05-29 DIAGNOSIS — E87.6: ICD-10-CM

## 2020-05-29 DIAGNOSIS — I10: ICD-10-CM

## 2020-05-29 DIAGNOSIS — M47.817: ICD-10-CM

## 2020-05-29 DIAGNOSIS — R29.6: ICD-10-CM

## 2020-05-29 DIAGNOSIS — M16.10: ICD-10-CM

## 2020-05-29 DIAGNOSIS — E86.0: ICD-10-CM

## 2020-05-29 DIAGNOSIS — R42: ICD-10-CM

## 2020-05-29 DIAGNOSIS — R53.1: Primary | ICD-10-CM

## 2020-05-29 LAB
ADD MANUAL DIFF / SLIDE REVIEW: NO
ALBUMIN SERPL-MCNC: 4.8 G/DL (ref 3.5–5)
ALBUMIN/GLOB SERPL: 1.5 {RATIO} (ref 1–2.8)
ALP SERPL-CCNC: 68 U/L (ref 38–126)
ALT SERPL-CCNC: 21 IU/L (ref ?–35)
BUN SERPL-MCNC: 9 MG/DL (ref 7–17)
CALCIUM SERPL-MCNC: 10.2 MG/DL (ref 8.4–10.2)
CHLORIDE SERPL-SCNC: 93 MMOL/L (ref 98–107)
CO2 SERPL-SCNC: 27 MMOL/L (ref 22–32)
ESTIMATED GLOMERULAR FILT RATE: > 60 ML/MIN (ref 60–?)
GLOBULIN SER CALC-MCNC: 3.3 G/DL (ref 1.7–4.1)
GLUCOSE SERPL-MCNC: 109 MG/DL (ref 80–110)
HEMATOCRIT: 44.1 % (ref 36–46)
HEMOGLOBIN: 15.5 G/DL (ref 12–16)
HEMOLYSIS: < 15 (ref 0–50)
LACTATE SERPL-MCNC: 1.1 MMOL/L (ref 0.7–2.1)
LYMPHOCYTES # SPEC AUTO: 900 /UL (ref 1100–4500)
MCV RBC: 92 FL (ref 80–100)
MEAN CORPUSCULAR HEMOGLOBIN: 32.3 PG (ref 26–34)
MEAN CORPUSCULAR HGB CONC: 35.1 % (ref 30–36)
PLATELET COUNT: 356 X10^3/UL (ref 150–400)
POTASSIUM SERPL-SCNC: 3.2 MMOL/L (ref 3.4–5.1)
PROT SERPL-MCNC: 8.1 G/DL (ref 6.3–8.2)
SODIUM SERPL-SCNC: 129 MMOL/L (ref 137–145)

## 2020-05-29 PROCEDURE — 99284 EMERGENCY DEPT VISIT MOD MDM: CPT

## 2020-05-29 PROCEDURE — 93005 ELECTROCARDIOGRAM TRACING: CPT

## 2020-05-29 PROCEDURE — 81003 URINALYSIS AUTO W/O SCOPE: CPT

## 2020-05-29 PROCEDURE — 70450 CT HEAD/BRAIN W/O DYE: CPT

## 2020-05-29 PROCEDURE — 83605 ASSAY OF LACTIC ACID: CPT

## 2020-05-29 PROCEDURE — 93010 ELECTROCARDIOGRAM REPORT: CPT

## 2020-05-29 PROCEDURE — 96361 HYDRATE IV INFUSION ADD-ON: CPT

## 2020-05-29 PROCEDURE — 85651 RBC SED RATE NONAUTOMATED: CPT

## 2020-05-29 PROCEDURE — 80053 COMPREHEN METABOLIC PANEL: CPT

## 2020-05-29 PROCEDURE — 36415 COLL VENOUS BLD VENIPUNCTURE: CPT

## 2020-05-29 PROCEDURE — 99285 EMERGENCY DEPT VISIT HI MDM: CPT

## 2020-05-29 PROCEDURE — 85025 COMPLETE CBC W/AUTO DIFF WBC: CPT

## 2020-05-29 PROCEDURE — 97161 PT EVAL LOW COMPLEX 20 MIN: CPT

## 2020-05-29 PROCEDURE — 71045 X-RAY EXAM CHEST 1 VIEW: CPT

## 2020-05-29 PROCEDURE — 96360 HYDRATION IV INFUSION INIT: CPT

## 2020-05-29 PROCEDURE — 72170 X-RAY EXAM OF PELVIS: CPT

## 2020-05-29 PROCEDURE — 73130 X-RAY EXAM OF HAND: CPT

## 2020-05-29 NOTE — DI.RAD.S_ITS
PROCEDURE:  XR HAND RT MIN 3V  
   
INDICATIONS:  right hand pain with fall  
   
TECHNIQUE: 3 views of the hand(s) acquired.    
   
COMPARISON:  None.  
   
FINDINGS:    
   
Bones:  No fractures or dislocations.  Carpal bones are normally aligned.  No suspicious   
bony lesions.    
   
Soft tissues:  No suspicious soft tissue calcifications.    
   
IMPRESSION: No trauma found.  Mild degenerative osteoarthritis causing slight thinning of   
the joint interspaces distally.  
   
   
   
Dictated by: Mode Hendrickson M.D. on 5/29/2020 at 11:44       
Approved by: Mode Hendrickson M.D. on 5/29/2020 at 11:44

## 2020-05-29 NOTE — DI.RAD.S_ITS
PROCEDURE:  XR CHEST 1V  
   
INDICATIONS:  dysequilibrium, dragging right foot, falling  
   
TECHNIQUE:  One view of the chest was acquired.    
   
COMPARISON:  [facility], [facility], XR CHEST 1V, 6/18/2018, 10:19.  [facility], [facility],   
CHEST 1 VIEW, 6/06/2016, 13:12.  
   
FINDINGS:    
   
Surgical changes and devices: Left breast/axillary surgical clips.    
   
Lungs and pleura:  Lungs are abnormal with a chronic interstitial prominence little if   
any changed from the comparison studies from 2016 2018 given differences in technique and   
patient rotation and tilt rightward..  No pleural effusions or pneumothorax.    
   
Mediastinum:  Mediastinal contours appear normal.  Heart size is normal.    
   
Bones and chest wall:  No suspicious bony lesions.  Overlying soft tissues appear   
unremarkable.    
   
IMPRESSION: No change foun, chronic interstitial prominence, mildly reduced inspiratory   
volume.  Patient tilt and rotation mildly rightward.  
   
   
   
Dictated by: Mode Hendrickson M.D. on 5/29/2020 at 8:26       
Approved by: Mode Hendrickson M.D. on 5/29/2020 at 8:27

## 2020-05-29 NOTE — ED_ITS
"HPI - Extremity Problem    
General    
Chief complaint: Extremity Problem,Nontraumatic    
Stated complaint: lack of balance,right side dragging,nausea    
Time Seen by Provider: 05/29/20 07:14    
Source: patient    
Mode of arrival: Wheelchair    
Limitations: no limitations    
History of Present Illness    
HPI Narrative: CC:  Disequilibrium, weakness, falling episodes    
HPI:  The patient is a 79-year-old female with a history of dementia.  The p  
atient was last known to be well at approximately 3:00 a.m. yesterday.  The   
patient was noted to have disequilibrium with her balance being off in the   
patient having mole tubal episodes of falling and walking into the walls.  The   
patient fell twice yesterday without striking her head or neck.  She almost fell  
this morning.  The patient's granddaughter notices that she has been dragging   
her right foot and has had weakness.  The patient was able to walk with a   
shuffling gait with her cane for the nurse who put her in the room.  The patient  
has a history of hypertension but has never had a stroke, myocardial and no   
COPD.  The patient has never smoked cigarettes does not drink alcohol or use any  
drugs.  She has had no change in her hearing loss of hearing.  She has had no   
headache for history of migraines.  There has been no change in vision double   
vision loss of vision or scotomata.    
Related Data    
                                Home Medications    
    
    
    
 Medication  Instructions  Recorded  Confirmed    
     
hydrochlorothiazide 50 mg PO QDAY #0 12/06/16 12/31/19    
     
cholecalciferol (vitamin D3) 5,000 unit PO DAILY 06/06/18 12/31/19    
    
[Vitamin D3]       
     
doxepin 25 mg PO BEDTIME 06/06/18 12/31/19    
     
omeprazole 20 mg PO DAILY 06/06/18 12/31/19    
     
sucralfate 1 g PO QID 06/06/18 12/31/19    
     
fluticasone propionate 1 spray INTRANASAL DAILY 06/14/18 12/31/19    
     
latanoprost 1 drp OPHTHALMIC (EYE) BEDTIME 06/14/18 12/31/19    
     
levothyroxine 100 mcg PO DAILY 06/14/18 12/31/19    
     
meclizine 25 mg PO Q8H PRN 06/14/18 12/31/19    
     
mirtazapine 1 tab PO BEDTIME 06/14/18 12/31/19    
     
ondansetron HCl 8 mg PO Q8H PRN 06/14/18 12/31/19    
     
trazodone 100 mg PO BEDTIME PRN 06/14/18 12/31/19    
    
    
                                  Previous Rx's    
    
    
    
 Medication  Instructions  Recorded    
     
oxycodone-acetaminophen [Percocet] See Rx Instructions .ROUTE 06/19/18    
    
 .COMPLEX PRN #20 tab     
     
meloxicam 7.5 mg tablet 7.5 mg PO DAILY #7 tab 07/09/19    
    
    
    
                                    Allergies    
    
    
    
Allergy/AdvReac Type Severity Reaction Status Date / Time    
     
No Known Drug Allergies Allergy   Verified 12/31/19 10:47    
    
    
    
    
Review of Systems    
Review of Systems    
Narrative: REVIEW OF SYSTEMS:    
CONSTITUTIONAL:  There has been no fever chills or sweats    
NEUROLOGICAL:  No headache or stroke numbness tingling paresthesias anesthesia   
is paresis or paralysis.  No fall with head injury.  The patient has been having  
 weakness and dragging her right leg with disequilibrium.    
EENT:  The patient denies any history of vertigo loss of hearing sore throat na  
sal congestion visual changes.    
CARDIO-PULMONARY:  She has had no chest pain cough shortness of breath   
difficulty in breathing    
ENDOCRINE:  Denies being a diabetic    
HEMOTOLOGICAL:  No bleeding problems or bruising    
GASTROINTESTINAL:  No abdominal pain nausea vomiting diarrhea.  Periodically the  
 patient has rare nausea    
GENITAL URINARY:  No urinary symptoms    
MUSCULOSKELETAL/ RHEUMATOLOGICAL:  No back pain.    
    
    
Patient History    
Medical History (Reviewed 05/29/20 @ 07:33 by Redd Morales MD)    
    
Anxiety (Acute)    
Bakers cyst (Acute)    
Bruises easily (Acute)    
Fall as cause of accide
[id number]|2020-05-29 07:53:00|2020-05-29 08:10:00|DI.CT.S_ITS|HARS|Imaging|[id number]|"PROCEDURE:  CT HEAD/BRAIN WO CON

## 2020-05-29 NOTE — CM.DPNOTE
DCP Note

This MSW requested to complete HH referral for this 78 yo female, presents to the ED w/ her Grand dtr, complaining of nausea, LOB, been falling at home per family. PT Vanessa has assessed, cleared for home w/family assist and recommends HH. 

Met w/Radha and grand dtr Hermelinda P# [phone], introduced role. Radha is eager to leave the ER, but is agreeable to home health services. Grand dtr, her spouse and their 2 kids live w/Radha. Dtr Lupis Moore P# [phone] lives in [place] 
and is one of Radha's DPOAs, according to Hermelinda. Strongly encouraged Radha and her family to fax updated DPOA ppk to [facility]. 

Placed call to je HH, spoke w/ Kiran, requested RN/PT/OT/MSW f/u for Radha. Faxed signed F2F, ER H+P, therapy note and MSW note. 

Connected w/Grand dtr Hermelinda outside of Radha's room who explained Radha has mild dementia that seems to be getting worse. Radha is exhibiting more paranoid behavior at home. Hermelinda appreciative for the help from HH PT/OT/MSW to teach she and 
her family how best to assist her grandma and ideas on how to plan for the future. 

P: Home today, from the ER, w/family to assist and je HH to f/u

ALBARO Duran

## 2020-05-29 NOTE — DI.RAD.S_ITS
PROCEDURE:  XR PELVIS 1-2V  
   
INDICATIONS:  dysequilibrium, recent falls, right leg weakness  
   
TECHNIQUE: Single frontal view of the pelvis acquired.    
   
COMPARISON:  [facility], PELVIS 1 OR 2 VIEWS, 7/31/2008, 12:30.  
   
FINDINGS:    
   
Bones:  No fractures or dislocations.  No suspicious bony lesions.    
   
Soft tissues:  Visualized bowel gas pattern is normal.  No suspicious soft tissue   
calcifications.    
   
IMPRESSION: No trauma found.  Mild symmetric hip joint osteoarthritis.  Severe   
degenerative disc disease and facet osteoarthritis is partially visualized along the   
lower half of the LS-spine included on this study.  Nerve root impingement would be   
expected, and followup by MR scanning may be warranted.  
   
   
   
Dictated by: Mode Hendrickson M.D. on 5/29/2020 at 8:27       
Approved by: Mode Hendrickson M.D. on 5/29/2020 at 8:29

## 2020-05-29 NOTE — ED.EXTPRO
HPI - Extremity Problem
General
Chief complaint: Extremity Problem,Nontraumatic
Stated complaint: lack of balance,right side dragging,nausea
Time Seen by Provider: 05/29/20 07:14
Source: patient
Mode of arrival: Wheelchair
Limitations: no limitations
History of Present Illness
HPI Narrative: CC:  Disequilibrium, weakness, falling episodes
HPI:  The patient is a 79-year-old female with a history of dementia.  The patient was last known to be well at approximately 3:00 a.m. yesterday.  The patient was noted to have disequilibrium with her balance being off in the patient having mole 
tubal episodes of falling and walking into the walls.  The patient fell twice yesterday without striking her head or neck.  She almost fell this morning.  The patient's granddaughter notices that she has been dragging her right foot and has had 
weakness.  The patient was able to walk with a shuffling gait with her cane for the nurse who put her in the room.  The patient has a history of hypertension but has never had a stroke, myocardial and no COPD.  The patient has never smoked 
cigarettes does not drink alcohol or use any drugs.  She has had no change in her hearing loss of hearing.  She has had no headache for history of migraines.  There has been no change in vision double vision loss of vision or scotomata.
Related Data
Home Medications

 Medication  Instructions  Recorded  Confirmed
hydrochlorothiazide 50 mg PO QDAY #0 12/06/16 12/31/19
cholecalciferol (vitamin D3) 5,000 unit PO DAILY 06/06/18 12/31/19
[Vitamin D3]   
doxepin 25 mg PO BEDTIME 06/06/18 12/31/19
omeprazole 20 mg PO DAILY 06/06/18 12/31/19
sucralfate 1 g PO QID 06/06/18 12/31/19
fluticasone propionate 1 spray INTRANASAL DAILY 06/14/18 12/31/19
latanoprost 1 drp OPHTHALMIC (EYE) BEDTIME 06/14/18 12/31/19
levothyroxine 100 mcg PO DAILY 06/14/18 12/31/19
meclizine 25 mg PO Q8H PRN 06/14/18 12/31/19
mirtazapine 1 tab PO BEDTIME 06/14/18 12/31/19
ondansetron HCl 8 mg PO Q8H PRN 06/14/18 12/31/19
trazodone 100 mg PO BEDTIME PRN 06/14/18 12/31/19

Previous Rx's

 Medication  Instructions  Recorded
oxycodone-acetaminophen [Percocet] See Rx Instructions .ROUTE 06/19/18
 .COMPLEX PRN #20 tab 
meloxicam 7.5 mg tablet 7.5 mg PO DAILY #7 tab 07/09/19


Allergies

Allergy/AdvReac Type Severity Reaction Status Date / Time
No Known Drug Allergies Allergy   Verified 12/31/19 10:47



Review of Systems
Review of Systems
Narrative: REVIEW OF SYSTEMS:
CONSTITUTIONAL:  There has been no fever chills or sweats
NEUROLOGICAL:  No headache or stroke numbness tingling paresthesias anesthesia is paresis or paralysis.  No fall with head injury.  The patient has been having weakness and dragging her right leg with disequilibrium.
EENT:  The patient denies any history of vertigo loss of hearing sore throat nasal congestion visual changes.
CARDIO-PULMONARY:  She has had no chest pain cough shortness of breath difficulty in breathing
ENDOCRINE:  Denies being a diabetic
HEMOTOLOGICAL:  No bleeding problems or bruising
GASTROINTESTINAL:  No abdominal pain nausea vomiting diarrhea.  Periodically the patient has rare nausea
GENITAL URINARY:  No urinary symptoms
MUSCULOSKELETAL/ RHEUMATOLOGICAL:  No back pain.


Patient History
Medical History (Reviewed 05/29/20 @ 07:33 by Redd Morales MD)

Anxiety (Acute)
Bakers cyst (Acute)
Bruises easily (Acute)
Fall as cause of accidental injury at home as place of occurrence (Inactive)
History of UTI (Acute)
Hypothyroid (Acute)
Impaired vision (Acute)
Pubic ramus fracture (Acute)
Sebaceous cyst of skin of left breast (Acute)
Spinal stenosis (Acute)


Surgical History (Reviewed 05/29/20 @ 07:33 by Redd Morales MD)

History of colonoscopy (Acute)
History of esophagogastroduodenoscopy (EGD) (Acute)
Status post breast lumpectomy
Status post hysterectomy with oophorectomy


Family History (Reviewed 05/29/20 @ 07:33 by Redd Morales MD)
Sister  Age: 77
 Osteoporosis


Social History (Reviewed 06/1
[id number]|2020-05-29 09:44:00|2020-05-29 09:44:00|PT.IIE||||

## 2022-11-01 NOTE — DI.MG.S_ITS
BILATERAL DIGITAL SCREENING MAMMOGRAM 3D/2D WITH CAD POST LUMPECTOMY: 4/11/2019  
   
CLINICAL: Routine screening. Family history of breast cancer. Personal history of breast   
cancer.    
   
Comparison is made to exams dated:  5/8/2017 mammogram, 5/4/2016 mammogram, and 5/1/2015   
mammogram - [facility].  There are scattered fibroglandular elements in both   
breasts.    
   
Current study was also evaluated with a Computer Aided Detection (CAD) system.    
   
There are benign post operative findings in the left breast.  There also are benign   
vascular calcifications in both breasts.  There are mole markers on the right breast.    
No significant masses, calcifications, or other findings are seen in either breast.    
There has been no significant interval change.  
   
IMPRESSION:   
There is no mammographic evidence of malignancy. A 1 year screening mammogram is   
recommended.     
   
This exam was interpreted at Station ID: [id number].    
   
NOTE: For mammograms, a report in lay terms will be sent to the patient. Approximately   
15% of breast malignancies will not be visualized mammographically. In the management of   
a palpable breast mass, a negative mammogram must not discourage biopsy of a clinically   
suspicious lesion.  
   
Electronically Signed By: Atul kc/sushila:4/11/2019 14:27:00    
copy to: Preston Shi  
   
   
letter sent: Normal Exam    
ACR BI-RADS Category 2: Benign Finding(s) 3342F
PERRL/EOMI/conjunctiva clear/normal

## 2023-07-18 NOTE — PT.IPTN
"
Current Diagnoses

Sebaceous cyst (06/15/18)
Other benign mammary dysplasias of left breast (06/15/18)
Other specified fracture of unspecified pubis, initial encounter for closed fracture (06/15/18)

Surgery Performed

Operation Date: 06/14/18 15:15
Actual Procedures
p Excision Breast Cyst(Left) - Preston Shi MD

Physical Therapy Treatment Note

M2 PT-IP Current Condition                                 Start:  06/15/18 12:22
Freq:   AS NEEDED                                          Status: Active        
Protocol:                                                                        
 Document     06/15/18 15:14  AB  (Rec: 06/15/18 15:23  AB  [id number])
 Physical Therapy Current Condition
     Current Condition
      Evaluation Date                            06/15/18
      Treatment Diagnosis                        s/p L lumpectomy
      Onset Date                                 06/14/18
     Weight Bearing Status
      Weight Bearing Status                      Non-Weight Bearing
      Allowed Weight Bearing Amount (enter %     pt seen for PT eval this
       or #) (%)                                 morning. spoke with case
                                                 manager regarding [facility]
                                                 recommendation this morning.
                                                  informed PT ~2pm
                                                 that pt was seen by Dr. Constantino for R hip pain and
                                                 MRI was ordered.  Checked for
                                                 MRI result and no result as of
                                                 this time.  Read Dr. Constantino
                                                 's consultation note ~310 pm
                                                 and per documentation:   At
                                                 this time she should be
                                                 nonweightbearing on the right
                                                 lower extremity until advanced
                                                 imaging is reviewed. 
M3 PT-IP Subjective                                        Start:  06/15/18 12:22
Freq:   AS NEEDED                                          Status: Active        
Protocol:                                                                        
 Document     06/16/18 09:43  AB  (Rec: 06/16/18 09:46  AB  [id number])
 Subjective
     Physical Therapy Visit Type
      Notes                                      pt with the following Hip MRI
                                                 result:
                                                  MPRESSION:
                                                 1.  No acute right hip
                                                 fracture.
                                                 2.  Nondisplaced superior and
                                                 inferior right pubic rami
                                                 fractures.
                                                 3.  Right sacral insufficiency
                                                 fracture.
                                                 4.  Adductor muscle edema is
                                                 felt to be reactive.
                                                 5.  Small anterosuperior right
                                                 acetabular labral tear.
                                                 6.  Small right hip joint
                                                 effusion.
                                                 7.  Mild distal gluteus medius
                                                 and gluteus minimus
                                                 tendinopathy.  Moderate amount
 
[id number]|2018-06-18 13:14:00|2018-06-18 13:14:00|PM.PN.1||||"Subjective Acitretin Pregnancy And Lactation Text: This medication is Pregnancy Category X and should not be given to women who are pregnant or may become pregnant in the future. This medication is excreted in breast milk.

## 2023-08-07 NOTE — PC.NURSE
Addendum entered by Gina Jennings R.N.  06/15/18 07:35: 

SIMIN drain had zero output. 

Original Note:

Pt denies pain on surgical site. she is however c/o R. hip pain from her recent fall. call light in reach. bed alarm active. 2 tabs of percocet for r. hip pain.
Addendum entered by Marah Crooks R.N.  06/17/18 13:47: 

Pt unable to void for a second time. Bladder scanned for 543. Phoned Dr. Scott and he ordered a williamson catheter to be placed. Report passed on to MICHELET Chawla and she is aware.

Original Note:

ASSESS- PT IS A&OX3 BUT IS FORGETFUL AND GROGGY THIS AM. UP TO CHAIR WITH 1-2PA. TRIED TO VOID THIS MORNING AND UNSUCCESSFUL. PT HAD 845CC OF BLADDER IN URINE AFTER BLADDER SCAN DONE. CALLED DR. DIETZ AND HE ORDERED A IN/OUT CATH TO BE DONE. PT HAD 
845 CC OF URINE OUT, AND SHE TOLERATED THE PROCEDURE WELL. URINE COLOR CLEAR AND YELLOW. PT IS UP IN CHAIR AND NO PAIN MEDICATION GIVEN THIS MORNING, AS PT HAS BEEN GROGGY AND SLEEPING A LOT. SHE IS NOW ON 2L OF O2 AND SATS ARE 93-94%. BS WNL BUT 
SLIGHTLY DECREASED IN THE LOBES. SHE IS FORGETFUL AND WILL TAKE HER OXYGEN OFF, WE GENTLY PUT THE O2  CANNULAS BACK IN HER NARES.  INTO SEE PATIENT AND D/CD ROUTINE PERCOCET ORDERS AND HAS MADE THEM EVERY 6 HOURS PRN. PT ALSO HAS A MORPHINE 
IV ORDER FOR BREAK THROUGH PAIN IF NEEDED.
Addendum entered by Pedrito Ytaes R.N.  06/15/18 11:56: 

Pt has had difficulty voiding this shift. Bladder scan of 506ml at 0953, she was eventually able to void 350 with some unmeasured on linens. Post void of 282ml at 1150. 

Original Note:

Fluids stopped for ordered imaging.
Addendum entered by Radha Linder R.N.  06/18/18 13:06: 

PAIN/ - after family left and pt elizabeth lunch, given 2mg po dilaudid for pain 4 on scale 0/10, back and l breast, williamson balloon deflated and dc'd w/o difficulty.

Original Note:

AM NOTE - alert, smiling, states no pain at rest, earlier dilaudid po providing adequate relief, 2l 94%, expir wheezes, sutures l breast intact, 4x4 c,d,i and wearing soft binder, discussed constipation and narcotics, given new order mom and prune 
juice 7 up this am with breakfast, enc freq use IS and pt demonstrated correct use to 1200, when 02 off, sat decr to about 88%, Dr. Cobb in this am and cxr ordered and completed, ivf saline locked, phys therapy in and pt up slowly x1 person, ambul 
in room and to chair, sat short period and then req back to bed.
Addendum entered by Shweta Zuniga R.N.  06/17/18 08:06: 
Pt stated feeling dizzy while in bed. Sat up in bed, no change, pt had taken her 2L NC O2 off herself. OOB to BSC with 2PA. Denies chest pain, pain with deep breathing, any change in respiratory status. RT aware at 0720.


Original Note:

Night Shift- Pt A&OX3, forgetful to plan of care, during shift change around 2320, called out loud X2 from bed,  nurse, hello . Had call light within reach. Slept on/off throughout night. OOB several times to BSC, voiding qs, no BM. IVF infusing 
well to left AC PIV. Pt stated having breast surgery over 10 years ago & had 1 lymph node dissection to left axilla. Spoke with RN coordinator, Left PIV in place to left AC. Rated 4/10 pain to right hip/pelvic area, 2/10 aching/soreness to left 
breast incision area. Scheduled Percocet given with 1 dose of Morphine IV for BTP at 0300 for 6-7/10 pain after movement & pain not settling in a resting position.
At 0520 after pt back to bed after BSC. O2 sat 87% on RA, after deep breathing exercises, O2 not increasing, pt denies any respiratory distress, talking to writer without shortness of breath. O2 1L NC placed & continuous O2 monitoring. O2 sat 
increased to 94% on 1L. Incentive spirometer instructed & pt able to demonstrate, will need reminders to use. Volume to 500cc, only able to hold breath for 2 seconds. 
Left breast dressing intact with small old sang drainage shadowing, breast binder in place throughout night. High fall risk precautions in place.
Night Shift- Pt A&OX3, forgetful, able to make needs known using call light. Tends to take herself off bedpan prior to calling for help even though instructed not too take herself off bedpan. Spilled bedpan/inc, pt at first refused to have linens 
changed, instructed it needed to be done & pt agreed for staff to be quick. At beginning of shift pt refused prn meds, With repositioning, pt had facial grimacing and moaning, encouraged pt to take prn med. Percocet 2tabs given at 0320 with good 
effect. Pain to right groin/pelvic area 7/10. states 2/10  not a lot  of pain to left chest incision area. Roque drain insitu with bulb suction. Left chest dressing intact with small amount of shadowing. Radial pulse strong, no hematoma noted. 
Surgical bra in place. IVF infusing well. High fall risk precautions in place.
Patient tolerating pain in AM reporting 3/10 pain. After working with PT and up to the BR 2x, pt reporting 7/10 pain. Administered 1 tab (2mg) Dilaudid. Patient reassessed and reporting 3/10 pain and tolerating well. Granddaughter and 
great-grandkids at bedside. Patient all packed up, IV removed, and packet provided to [facility] staff.
pt complaining of headache, refused percocet and would like to request Tylenol from doctor. Will call now.
0.5

## 2023-08-24 ENCOUNTER — HOSPITAL ENCOUNTER (OUTPATIENT)
Age: 83
End: 2023-08-24
Payer: MEDICARE

## 2023-08-24 VITALS — BODY MASS INDEX: 32.1 KG/M2

## 2023-08-24 DIAGNOSIS — M17.11: Primary | ICD-10-CM

## 2023-08-24 DIAGNOSIS — M11.261: ICD-10-CM

## 2023-08-24 PROCEDURE — 73562 X-RAY EXAM OF KNEE 3: CPT

## 2024-02-28 NOTE — P.PN_ITS
"Subjective    
Date Patient Seen: 06/15/18    
Time Patient Seen: 18:31    
Interval history: Asked to follow patient admitted with a unusual set of   
circumstances.  Patient had been scheduled for a breast mass removal today or   
written yesterday and in the interim in the last 24 hr had fallen and was   
unable to bear weight.  Presented to the emergency room was worked up felt like   
there was no x-ray evidence of fracture of pelvis or hip and there are able to   
let her leave the ER go up and have her surgical procedure.  At the end of that   
in recovery she was completely unable to bear weight or stand and further   
evaluation by surgery team with the CT scan showed a nondisplaced pelvic   
fractures in 2 places.  Patient is anxious with some mild dementia and was not   
agreeable at that point to being in a care facility for rehabilitation and pain   
control.  Has history of anxiety I think some mildly progressive dementia   
hypothyroidism    
    
Exam    
Vital Signs    
(past 8 hours):  Vital Signs - 8 hr    
    
 3    
    
 06/16/18    
06:30 06/16/18    
07:35 06/16/18    
10:12    
     
Temperature  98.7 F     
     
Pulse Rate  84     
     
Respiratory Rate  18     
     
Blood Pressure  109/64     
     
Pulse Oximetry 95 91 94    
    
    
     
    
Pulse Oximetry                   94    
Oxygen Delivery Method           Room Air    
Oxygen Flow Rate                 0    
    
    
Narrative    
Exam Narrative: Patient sitting alert in bed recognizes me instantly very clear   
communication and accurate history    
PERRLA EOMs intact.  Neck supple without thyromegaly or mass.  Lungs clear to   
auscultation and percussion.  Cardiovascular shows regular rate and rhythm   
without murmur or S3 or significant edema. Abdomen nontender no   
hepatosplenomegaly mass or rebound.  Neuro shows symmetrical sensory motor   
function speech clear hip.  Discomfort but no localized to the pelvic bones.    
CT review with Ortho indicates no surgery required pain management primarily    
    
Objective    
Labs    
    
Result Diagrams:     
 06/15/18 05:58              
    
 06/15/18 18:35              
    
Labs:  Laboratory Results - last 24 hr    
    
    
    
  06/15/18    
    
  18:35    
     
Sodium  131 L    
     
Potassium  3.1 L    
     
Chloride  94 L    
     
Carbon Dioxide  30    
     
BUN  13    
     
Creatinine  0.50 L    
     
Estimated GFR  > 60.0    
     
BUN/Creatinine Ratio  26.0 H    
     
Glucose  135 H    
     
Calcium  8.6    
    
    
    
    
Assessment & Plan    
Plan:     
      Assessment/Plan Narrative:     
Assessment 1.  Fall with nondisplaced fracture of superior and inferior pelvic   
rami on the right side.  Hip is without fracture patient having significant   
pain management issues.  Will move oral pain meds closer together continue with   
backup but may lower that dose.  Will go ahead and activate PT and OT for help   
with mobilization at this point.  Anticipate that patient will need a couple of   
days to get to a point where she is safe enough to participate in therapy or   
rehab.  We will be adjusting pain medicines and also IV fluids given her   
hypokalemia.    
    
Assessment 2.  Hypokalemia.  We will administer potassium today and   
consistently looked really and will relook at labs for tomorrow.    
    
Assessment 3.  Hypothyroidism with one-word pending labs will recheck TSH for   
stability.    
    
Assessment 4.  Mild confusion and dementia predating this.  I think mild-to-  
moderate forgetfulness but functions pretty well and independently at home will   
anticipate return to that    
    
Assessment 5.  Anxiety patient does have some anxiety I think it is fairly   
manageable and she has very good support group and the community.    
    
Quality    
VTE    
Deep Vein Thrombosis/Pulmonary Embolism Present on Admission: 
[id number]|2018-06-16 12:20:00|2018-06-16 12:16:00|PKEARA_ITS|[facility]|Health Information Management|[id number]|"Subjective
"Subjective    
Date Patient Seen: 06/17/18    
Time Patient Seen: 09:57    
Interval history: Had some mild confusion last night but seems to be resolve.    
She denies pain currently but is somewhat somnolent from pain medication   
requirements overnight.  She did have episode of urinary retention this morning   
requiring straight catheterization.  Denies any dysuria however.  Tolerated   
diet yesterday but has not yet eaten breakfast this morning.  Denies nausea or   
vomiting.  Now on 1-2 L nasal cannula oxygen to maintain saturation in the low   
90% range.  However, she denies any chest pain or shortness of breath.  She has   
no history of COPD or smoking history.    
    
Exam    
Vital Signs    
(past 8 hours):  Vital Signs - 8 hr    
    
 3    
    
 06/17/18    
04:11 06/17/18    
05:00 06/17/18    
05:20    
     
Temperature  97.4 F L     
     
Pulse Rate  81     
     
Respiratory Rate 16 16     
     
Blood Pressure  146/69 H     
     
Pulse Oximetry  89 L 87 L    
    
    
 3    
    
 06/17/18    
05:24 06/17/18    
05:27 06/17/18    
07:15    
     
Temperature   97.5 F L    
     
Pulse Rate   81    
     
Respiratory Rate   16    
     
Blood Pressure   153/74 H    
     
Pulse Oximetry 91 94 91    
    
    
     
    
Pulse Oximetry                   91    
Oxygen Delivery Method           Nasal Cannula    
Oxygen Flow Rate                 1    
    
    
Narrative    
Exam Narrative: Well-nourished well-developed female in no acute distress lying   
comfortably in bed. She is somewhat subdued this morning.  Nevertheless she is   
alert oriented x3.  Family is at bedside during my visit.  Nursing staff also   
present for my exam.    
Chest is clear to auscultation bilaterally with regular rate and rhythm. No   
murmurs, gallops, rubs.  No crackles or wheezes. She has good inspiratory   
effort.  Good cough effort as well.  Cough is nonproductive.    
Left breast incision is clean, dry, and intact.  No significant drainage. No   
erythema or significant ecchymoses.    
Abdomen is soft, nondistended, nontender    
    
Objective    
Labs    
    
Result Diagrams:     
 06/17/18 04:56              
    
 06/17/18 04:56              
    
Labs:  Laboratory Results - last 24 hr    
    
    
    
  06/17/18 06/17/18    
    
  04:56 04:56    
     
WBC  7.6     
     
RBC  3.56 L     
     
Hgb  11.6 L     
     
Hct  33.8 L     
     
MCV  95.0     
     
MCH  32.5     
     
MCHC  34.2     
     
RDW  13.2     
     
Plt Count  225     
     
Neut % (Auto)  67.7     
     
Lymph % (Auto)  11.7 L     
     
Mono % (Auto)  12.2     
     
Eos % (Auto)  7.9 H     
     
Baso % (Auto)  0.5     
     
Neut # (Auto)  5100     
     
Sodium   129 L    
     
Potassium   3.3 L    
     
Chloride   92 L    
     
Carbon Dioxide   29    
     
BUN   11    
     
Creatinine   0.60    
     
Estimated GFR   > 60.0    
     
BUN/Creatinine Ratio   18.3    
     
Glucose   127 H    
     
Calcium   8.0 L    
     
Total Bilirubin   0.3    
     
AST   79 H    
     
ALT   39    
     
Alkaline Phosphatase   55    
     
Total Protein   6.0 L    
     
Albumin   3.0 L    
     
Globulin   3.0    
     
Albumin/Globulin Ratio   1.0    
    
    
    
    
Assessment & Plan    
Plan:     
      Assessment/Plan Narrative:     
78-year-old female with normal healing of left breast incision status post   
removal of inflamed cyst.  No evidence of infection.  Etiology of her hypoxia   
is likely atelectasis due to significant decreased mobility.  Continue physical   
therapy and aggressive pulmonary toilet including incentive spirometry.  Wean   
oxygen as tolerated back to room air.  Continue diet as tolerated.  Changed to   
normal saline with potassium IV fluids for now to correct electrolytes.  Plan   
to be discharged to skilled nursing facility within t
[id number]|2018-06-15 00:00:00|2018-06-15 11:16:00|DI.RAD.S_ITS|[facility]|Imaging|[id number]|"PROCEDURE:  XR LUMBAR SPINE 2-3V
"Subjective    
Date Patient Seen: 06/17/18    
Time Patient Seen: 10:47    
Interval history: Patient a little bit groggy this morning.  Apparently   
required increased pain medicine through the night.  Has not been moving much   
and also is requiring a low oxygen this morning.  No signs of infection vital   
signs otherwise stable alert and was speaking clearly and this morning.  Seems   
to be in a little less discomfort she tells me she has having less pain and is   
scooting around in bed a little bit although still to 2 people to transfer her   
to a bedside commode.  Physical therapy working with her this morning.  We will   
be working also on incentive spirometry and RT working with her to try to get   
those lungs inflated.  Also a surgery is giving her some potassium in her IV   
fluids.  Anticipate that these things all improving tomorrow and anticipate   
hopefully rehabilitation to transfer to skilled nursing tomorrow.    
    
Exam    
Vital Signs    
(past 8 hours):  Vital Signs - 8 hr    
    
 3    
    
 06/17/18    
04:11 06/17/18    
05:00 06/17/18    
05:20    
     
Temperature  97.4 F L     
     
Pulse Rate  81     
     
Respiratory Rate 16 16     
     
Blood Pressure  146/69 H     
     
Pulse Oximetry  89 L 87 L    
    
    
 3    
    
 06/17/18    
05:24 06/17/18    
05:27 06/17/18    
07:15    
     
Temperature   97.5 F L    
     
Pulse Rate   81    
     
Respiratory Rate   16    
     
Blood Pressure   153/74 H    
     
Pulse Oximetry 91 94 91    
    
    
     
    
Pulse Oximetry                   91    
Oxygen Delivery Method           Nasal Cannula    
Oxygen Flow Rate                 1    
    
    
Narrative    
Exam Narrative: Patient sitting comfortably in bed with oxygen on by nasal   
cannula.  Speaking clearly cognitively functioning at her baseline.  PCR RLA   
EOMs intact.  Neck without mass.  Lungs clear to auscultation and percussion.    
Cardiac shows regular rate and rhythm without murmur S3.  Trace edema. First   
incision site good per surgery.  Abdomen soft nontender no hepatosplenomegaly.    
Neuro shows intact and symmetrical neuro motor and speech is clear.    
    
Objective    
Labs    
    
Result Diagrams:     
 06/17/18 04:56              
    
 06/17/18 04:56              
    
Labs:  Laboratory Results - last 24 hr    
    
    
    
  06/17/18 06/17/18    
    
  04:56 04:56    
     
WBC  7.6     
     
RBC  3.56 L     
     
Hgb  11.6 L     
     
Hct  33.8 L     
     
MCV  95.0     
     
MCH  32.5     
     
MCHC  34.2     
     
RDW  13.2     
     
Plt Count  225     
     
Neut % (Auto)  67.7     
     
Lymph % (Auto)  11.7 L     
     
Mono % (Auto)  12.2     
     
Eos % (Auto)  7.9 H     
     
Baso % (Auto)  0.5     
     
Neut # (Auto)  5100     
     
Sodium   129 L    
     
Potassium   3.3 L    
     
Chloride   92 L    
     
Carbon Dioxide   29    
     
BUN   11    
     
Creatinine   0.60    
     
Estimated GFR   > 60.0    
     
BUN/Creatinine Ratio   18.3    
     
Glucose   127 H    
     
Calcium   8.0 L    
     
Total Bilirubin   0.3    
     
AST   79 H    
     
ALT   39    
     
Alkaline Phosphatase   55    
     
Total Protein   6.0 L    
     
Albumin   3.0 L    
     
Globulin   3.0    
     
Albumin/Globulin Ratio   1.0    
    
    
    
    
Assessment & Plan    
Plan:     
      Assessment/Plan Narrative:     
Assessment 1.  Fractured pelvis with superior and inferior pelvic rami fracture   
without displacement.  Still requiring 2 person assist even just to move from   
lying in bed to the bedside commode.  Requiring assistance with shifting in   
bed.  Not be ready for real ambulation I do not think it is.  Try to decrease   
medications I think she got a little over-sedated through the night so I have   
lowered her dose of her morphine if she has severe p
[id number]|2018-06-18 00:00:00|2018-06-18 10:39:00|DI.RAD.S_ITS|[facility]|Imaging|[id number]|"PROCEDURE:  XR CHEST 1V
"Subjective    
Date Patient Seen: 06/18/18    
Time Patient Seen: 10:24    
Interval history: Pain better this morning.  She received oral Dilaudid in   
conjunction with oral Percocet last evening.  She has also received intravenous   
morphine within the last 24 hr.  No subjective fever or chills.  No chest pain.   
Denies any significant incisional pain at the left breast.  Tolerating a diet   
without difficulty. No nausea or vomiting.  Has had no bowel movement since   
admission.  Passing some flatus intermittently.  Denies shortness of breath but   
remains on 2 L nasal cannula oxygen which she does not normally required   
baseline.  Ryan catheter remains in place due to urinary retention likely   
secondary to narcotics and decreased mobility.    
    
Exam    
Vital Signs    
(past 8 hours):  Vital Signs - 8 hr    
    
 3    
    
 06/18/18    
05:00 06/18/18    
05:10 06/18/18    
07:05    
     
Temperature  97 F L 98.3 F    
     
Pulse Rate  80 79    
     
Respiratory Rate  24 16    
     
Blood Pressure  117/59 L 128/55 H    
     
Pulse Oximetry 94 94 97    
    
    
     
    
Pulse Oximetry                   97    
Oxygen Delivery Method           Nasal Cannula    
Oxygen Flow Rate                 2    
    
    
Narrative    
Exam Narrative: Well-nourished well-developed female sitting comfortably in   
bedside chair in no acute distress.  Alert oriented x3.  Remains on nasal   
cannula oxygen.    
Has audible wheezing today but no crackles.  No productive cough.  She is able   
to perform incentive spirometry to approximately  1000 cc only.    
Left breast dressing clean, dry, and intact    
Abdomen soft, nondistended, nontender    
Extremities show no significant edema    
    
Objective    
Labs    
    
Result Diagrams:     
 06/18/18 04:57              
    
 06/18/18 04:57              
    
Labs:  Laboratory Results - last 24 hr    
    
    
    
  06/18/18 06/18/18    
    
  04:57 04:57    
     
WBC  6.5     
     
RBC  3.35 L     
     
Hgb  10.9 L     
     
Hct  31.8 L     
     
MCV  94.9     
     
MCH  32.5     
     
MCHC  34.3     
     
RDW  13.3     
     
Plt Count  250     
     
Neut % (Auto)  65.0     
     
Lymph % (Auto)  11.8 L     
     
Mono % (Auto)  14.8 H     
     
Eos % (Auto)  7.8 H     
     
Baso % (Auto)  0.6     
     
Neut # (Auto)  4300     
     
Sodium   135 L    
     
Potassium   4.6  D    
     
Chloride   98    
     
Carbon Dioxide   32    
     
BUN   10    
     
Creatinine   0.50 L    
     
Estimated GFR   > 60.0    
     
BUN/Creatinine Ratio   20.0    
     
Glucose   102    
     
Calcium   8.4    
     
Total Bilirubin   0.3    
     
AST   53 H    
     
ALT   37    
     
Alkaline Phosphatase   58    
     
Total Protein   5.6 L    
     
Albumin   2.8 L    
     
Globulin   2.8    
     
Albumin/Globulin Ratio   1.0    
    
    
Portable chest x-ray still pending from this morning.    
    
Assessment & Plan    
Plan:     
      Assessment/Plan Narrative:     
78-year-old female with pelvic fracture causing significant narcotic pain   
requirement and decreased mobility which is compromising pulmonary status.    
Will check chest x-ray as ordered to ensure she does not have significant   
pulmonary edema, but this is less likely.  Hep-Lock IV now.  Convert to oral   
antibiotics.  Continue narcotic regimen.  Discontinue Ryan catheter and give a   
trial of spontaneous urination.  If he is not able to do so and shows evidence   
of ongoing retention and she will have to have a Ryan catheter for a few more   
days at least.  Major issue remains the pulmonary status.  Continue to wean   
oxygen and perform aggressive pulmonary toilet.  Assist bowel function.  Orders   
were written for such.  I discussed all the above with the patient and the   
attending nurse today at the bedside.  All questions
[id number]|2018-06-14 19:59:00|2018-06-14 19:54:00|P.OP_ITS|[facility]|Health Information Management|[id number]|"Operative Date/Time/Diagnoses
Subjective    
Date Patient Seen: 06/15/18    
Time Patient Seen: 11:51    
Interval history: Patient required 2 people to assist her to stand and sit in a   
chair.  She was unable to really walk and could not weight bear use the right   
leg well. One daughter asked me if she thought she had a stroke.    
    
Exam    
Vital Signs    
(past 8 hours):  Vital Signs - 8 hr    
    
 3    
    
 06/15/18    
09:34 06/15/18    
11:55 06/15/18    
13:10    
     
Temperature  97.2 F L     
     
Pulse Rate  77     
     
Respiratory Rate  16     
     
Blood Pressure  106/71     
     
Pulse Oximetry 93 94 94    
    
    
     
    
Pulse Oximetry                   94    
Oxygen Delivery Method           Room Air    
Oxygen Flow Rate                 0    
    
    
Narrative    
Exam Narrative: Her breast looks fine.  Drainage is bloody as would be   
expected.  It is not purulent.  There is no cellulitis.  There was drainage on   
the dressing as expected.  The patient complains of pain in the right hip and   
medially but she is alert as ever.  She is oriented. She moves her other   
extremities without difficulty.    
    
Objective    
Labs    
    
Result Diagrams:     
 06/15/18 05:58              
    
Labs:  Laboratory Results - last 24 hr    
    
    
    
  06/15/18    
    
  05:58    
     
WBC  9.1    
     
RBC  3.88 L    
     
Hgb  12.6    
     
Hct  36.7    
     
MCV  94.7    
     
MCH  32.4    
     
MCHC  34.2    
     
RDW  13.6    
     
Plt Count  231    
     
Neut % (Auto)  78.2 H    
     
Lymph % (Auto)  10.0 L    
     
Mono % (Auto)  8.1    
     
Eos % (Auto)  2.9    
     
Baso % (Auto)  0.8    
     
Neut # (Auto)  7100 H    
    
    
    
    
Assessment & Plan Post-op    
(1) Sebaceous cyst of skin of left breast:     
      Current Visit: Yes    
      Status: Acute    
      Assessment and plan: The site looks fine.  Drain can probably be removed   
tomorrow.  I will continue p.o. antibiotics for another 5 days, given the   
inflammation at operation and the depth of this wound and the prior radiation   
to the left breast.    
(2) Pubic ramus fracture:     
      Problem details:    
Newly discovered related to preop/pre-admission fall at home.    
      Current Visit: Yes    
      Status: Acute    
      Assessment and plan: Plan Per Dr. Constantino.  Will require at least   
temporary skilled nursing home placement to rehab this hip.  Patient has a home   
with stairs and is unable to walk and lives alone.    
Postoperative    
 Procedures    
    
 Operation Date: 06/14/18 15:15    
    
    
Actual Procedures Side Surgeon    
    
p Excision Breast Cyst Left Preston Shi MD    
    
    
    
Postoperative day: 1    
Time Spent With Patient    
25 - 35 minutes    
    
Quality    
VTE    
Deep Vein Thrombosis/Pulmonary Embolism Present on Admission: No
Subjective    
Date Patient Seen: 06/16/18    
Time Patient Seen: 13:52    
Interval history: Patient of of in is clear today.  Continues to struggle with   
pain.  Very limited movement and needing assist even for back bedside commode.    
Will an active physical therapy and OT today.  Low K and has gotten some   
potassium from surgeons will continue that on a consistent basis and monitor   
labs for tomorrow.  She is agreeable now to this point for rehabilitation for a   
short period time to get pain control physical therapy stability and balance   
rehab initiated.  Family is in agreement with that and met with patient as well   
as family and they had all their questions answered.    
    
Exam    
Vital Signs    
(past 8 hours):  Vital Signs - 8 hr    
    
 3    
    
 06/16/18    
06:30 06/16/18    
07:35 06/16/18    
10:12    
     
Temperature  98.7 F     
     
Pulse Rate  84     
     
Respiratory Rate  18     
     
Blood Pressure  109/64     
     
Pulse Oximetry 95 91 94    
    
    
     
    
Pulse Oximetry                   94    
Oxygen Delivery Method           Room Air    
Oxygen Flow Rate                 0    
    
    
Narrative    
Exam Narrative: Patient is pleasant alert recognizes me come more comfortably   
lengthy visit today about the rehabilitation and physical therapy bowels rehab   
for venting falls and she is on board with our plan. Vital signs are stable at   
this point PE are RO a EOMs intact lungs clear to auscultation percussion heart   
shows regular rate and rhythm without murmur is 3 abdomen soft nontender no   
hepatosplenomegaly or mass decreased movement of right lower extremity   
secondary to discomfort in the pelvis area speech clear.    
    
Objective    
Labs    
    
Result Diagrams:     
 06/15/18 05:58              
    
 06/15/18 18:35              
    
Labs:  Laboratory Results - last 24 hr    
    
    
    
  06/15/18    
    
  18:35    
     
Sodium  131 L    
     
Potassium  3.1 L    
     
Chloride  94 L    
     
Carbon Dioxide  30    
     
BUN  13    
     
Creatinine  0.50 L    
     
Estimated GFR  > 60.0    
     
BUN/Creatinine Ratio  26.0 H    
     
Glucose  135 H    
     
Calcium  8.6    
    
    
    
    
Assessment & Plan    
Plan:     
      Assessment/Plan Narrative:     
Assessment 1.  Pelvic fracture following fall at home.  Inferior and superior   
pelvic rami on the side fracture but nondisplaced seems fairly stable and   
primarily comfort management and and get ability patient with ability to be   
able to stand and move in care of herself without full assistance    
    
Assessment 2.  Anxiety patient is coming now that she has had time to review   
these issues and her regular medications I think are adequate for this.    
    
Assessment 3.  Hypokalemia.  Will supplement the potassium starting today.    
    
Assessment 4.  Pain management will increase pain med frequency for the oral   
see if we can get head pain and we need I then IV med we can do that at a lower   
dose possibly consider PCA.    
    
Assessment 5.  Hypothyroid stable at this point will continue on her regular   
meds    
    
Quality    
VTE    
Deep Vein Thrombosis/Pulmonary Embolism Present on Admission: No
Bed/Stretcher in lowest position, wheels locked, appropriate side rails in place/Call bell, personal items and telephone in reach/Instruct patient to call for assistance before getting out of bed/chair/stretcher/Non-slip footwear applied when patient is off stretcher/[place] to call system/Physically safe environment - no spills, clutter or unnecessary equipment/Purposeful proactive rounding/Room/bathroom lighting operational, light cord in reach

## 2024-09-09 ENCOUNTER — HOSPITAL ENCOUNTER (OUTPATIENT)
Age: 84
End: 2024-09-09
Payer: MEDICARE

## 2024-09-09 VITALS — BODY MASS INDEX: 32.1 KG/M2

## 2024-09-09 DIAGNOSIS — M17.11: Primary | ICD-10-CM

## 2024-09-09 DIAGNOSIS — M25.561: ICD-10-CM

## 2024-09-09 DIAGNOSIS — G89.29: ICD-10-CM

## 2024-09-09 PROCEDURE — 73562 X-RAY EXAM OF KNEE 3: CPT

## 2024-09-09 NOTE — DI.RAD.S_ITS
PROCEDURE:  XR KNEE RT 3V 
  
INDICATIONS:  chronic right knee pain 
  
TECHNIQUE:  3 views of the knee were acquired.   
  
COMPARISON:  [facility], , XR KNEE RT 3V, 8/24/2023, 15:59. 
  
FINDINGS:   
  
Bones:  No acute fracture or dislocation. 
  
Severe joint space narrowing of the medial compartment with essentially bone-on-bone  
contact and subchondral sclerotic changes.  Moderate to severe joint space narrowing with  
involving the lateral compartment.  This has progressed compared to previous radiograph  
of 8/24/2023.  Severe narrowing of the patellofemoral joint. 
  
Chondrocalcinosis within the lateral compartment of the knee joint. 
  
Soft tissues:  No joint effusion.  No suspicious soft tissue calcifications.   
  
  
IMPRESSION: 
   
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 
  
  
Dictated by: Carlos Eduardo Torres M.D. on 9/10/2024 at 8:26      
Approved by: Carlos Eduardo Torres M.D. on 9/10/2024 at 8:28

## 2024-10-10 ENCOUNTER — HOSPITAL ENCOUNTER (OUTPATIENT)
Dept: HOSPITAL 73 - RAD | Age: 84
End: 2024-10-10
Payer: MEDICARE

## 2024-10-10 VITALS — BODY MASS INDEX: 32.1 KG/M2

## 2024-10-10 DIAGNOSIS — R05.3: Primary | ICD-10-CM

## 2024-10-10 PROCEDURE — 71046 X-RAY EXAM CHEST 2 VIEWS: CPT

## 2024-10-10 NOTE — DI.RAD.S_ITS
PROCEDURE:  XR CHEST 2V 
  
INDICATIONS:  COUGH 
  
TECHNIQUE:  2 views of the chest were acquired.   
  
COMPARISON:  [facility], CR, XR CHEST 1V, 5/29/2020, 7:23. 
  
FINDINGS:   
  
Surgical changes and devices:  None.   
  
Lungs and pleura:  Lungs are clear.  No pleural effusions or pneumothorax.  Elevated  
right hemidiaphragm 
  
Mediastinum:  Mediastinal contours are normal.  Heart size is normal.   
  
Bones and chest wall:  No suspicious bony abnormalities.  Soft tissues appear  
unremarkable.   
  
  
IMPRESSION:   
  
No acute cardiopulmonary abnormality is seen.  
  
  
  
Approved by: Jean Paul James M.D. on 10/10/2024 at 17:29